# Patient Record
Sex: MALE | Race: WHITE | NOT HISPANIC OR LATINO | Employment: OTHER | URBAN - METROPOLITAN AREA
[De-identification: names, ages, dates, MRNs, and addresses within clinical notes are randomized per-mention and may not be internally consistent; named-entity substitution may affect disease eponyms.]

---

## 2022-11-15 ENCOUNTER — OFFICE VISIT (OUTPATIENT)
Dept: FAMILY MEDICINE CLINIC | Facility: CLINIC | Age: 37
End: 2022-11-15

## 2022-11-15 ENCOUNTER — LAB (OUTPATIENT)
Dept: LAB | Facility: HOSPITAL | Age: 37
End: 2022-11-15

## 2022-11-15 VITALS
WEIGHT: 198.4 LBS | RESPIRATION RATE: 21 BRPM | DIASTOLIC BLOOD PRESSURE: 92 MMHG | SYSTOLIC BLOOD PRESSURE: 120 MMHG | HEART RATE: 85 BPM | OXYGEN SATURATION: 100 % | HEIGHT: 72 IN | BODY MASS INDEX: 26.87 KG/M2 | TEMPERATURE: 98.4 F

## 2022-11-15 DIAGNOSIS — Z11.3 SCREENING EXAMINATION FOR STD (SEXUALLY TRANSMITTED DISEASE): ICD-10-CM

## 2022-11-15 DIAGNOSIS — R03.0 ELEVATED BLOOD PRESSURE READING: ICD-10-CM

## 2022-11-15 DIAGNOSIS — Z00.00 ENCOUNTER FOR ROUTINE ADULT HEALTH EXAMINATION WITHOUT ABNORMAL FINDINGS: ICD-10-CM

## 2022-11-15 PROBLEM — F17.200 SMOKING: Status: RESOLVED | Noted: 2022-11-15 | Resolved: 2022-11-15

## 2022-11-15 PROBLEM — F17.210 CIGARETTE NICOTINE DEPENDENCE: Status: ACTIVE | Noted: 2022-11-15

## 2022-11-15 PROBLEM — F17.200 SMOKING: Status: ACTIVE | Noted: 2022-11-15

## 2022-11-15 PROBLEM — I10 PRIMARY HYPERTENSION: Status: RESOLVED | Noted: 2022-11-15 | Resolved: 2022-11-15

## 2022-11-15 PROBLEM — I10 PRIMARY HYPERTENSION: Status: ACTIVE | Noted: 2022-11-15

## 2022-11-15 LAB
ALBUMIN SERPL-MCNC: 4.6 G/DL (ref 3.5–5.2)
ALBUMIN/GLOB SERPL: 1.8 G/DL
ALP SERPL-CCNC: 85 U/L (ref 39–117)
ALT SERPL W P-5'-P-CCNC: 73 U/L (ref 1–41)
ANION GAP SERPL CALCULATED.3IONS-SCNC: 12.8 MMOL/L (ref 5–15)
AST SERPL-CCNC: 67 U/L (ref 1–40)
BILIRUB SERPL-MCNC: <0.2 MG/DL (ref 0–1.2)
BUN SERPL-MCNC: 11 MG/DL (ref 6–20)
BUN/CREAT SERPL: 12.2 (ref 7–25)
CALCIUM SPEC-SCNC: 9.9 MG/DL (ref 8.6–10.5)
CHLORIDE SERPL-SCNC: 100 MMOL/L (ref 98–107)
CHOLEST SERPL-MCNC: 223 MG/DL (ref 0–200)
CO2 SERPL-SCNC: 24.2 MMOL/L (ref 22–29)
CREAT SERPL-MCNC: 0.9 MG/DL (ref 0.76–1.27)
EGFRCR SERPLBLD CKD-EPI 2021: 111.4 ML/MIN/1.73
GLOBULIN UR ELPH-MCNC: 2.6 GM/DL
GLUCOSE SERPL-MCNC: 97 MG/DL (ref 65–99)
HBA1C MFR BLD: 5 % (ref 4.8–5.6)
HDLC SERPL-MCNC: 86 MG/DL (ref 40–60)
HIV1+2 AB SER QL: NORMAL
LDLC SERPL CALC-MCNC: 125 MG/DL (ref 0–100)
LDLC/HDLC SERPL: 1.43 {RATIO}
POTASSIUM SERPL-SCNC: 4.8 MMOL/L (ref 3.5–5.2)
PROT SERPL-MCNC: 7.2 G/DL (ref 6–8.5)
SODIUM SERPL-SCNC: 137 MMOL/L (ref 136–145)
TRIGL SERPL-MCNC: 68 MG/DL (ref 0–150)
VLDLC SERPL-MCNC: 12 MG/DL (ref 5–40)

## 2022-11-15 PROCEDURE — 86803 HEPATITIS C AB TEST: CPT | Performed by: STUDENT IN AN ORGANIZED HEALTH CARE EDUCATION/TRAINING PROGRAM

## 2022-11-15 PROCEDURE — 99385 PREV VISIT NEW AGE 18-39: CPT | Performed by: STUDENT IN AN ORGANIZED HEALTH CARE EDUCATION/TRAINING PROGRAM

## 2022-11-15 PROCEDURE — 90686 IIV4 VACC NO PRSV 0.5 ML IM: CPT | Performed by: STUDENT IN AN ORGANIZED HEALTH CARE EDUCATION/TRAINING PROGRAM

## 2022-11-15 PROCEDURE — 80053 COMPREHEN METABOLIC PANEL: CPT | Performed by: STUDENT IN AN ORGANIZED HEALTH CARE EDUCATION/TRAINING PROGRAM

## 2022-11-15 PROCEDURE — G0432 EIA HIV-1/HIV-2 SCREEN: HCPCS | Performed by: STUDENT IN AN ORGANIZED HEALTH CARE EDUCATION/TRAINING PROGRAM

## 2022-11-15 PROCEDURE — 80061 LIPID PANEL: CPT | Performed by: STUDENT IN AN ORGANIZED HEALTH CARE EDUCATION/TRAINING PROGRAM

## 2022-11-15 PROCEDURE — 90471 IMMUNIZATION ADMIN: CPT | Performed by: STUDENT IN AN ORGANIZED HEALTH CARE EDUCATION/TRAINING PROGRAM

## 2022-11-15 PROCEDURE — 83036 HEMOGLOBIN GLYCOSYLATED A1C: CPT | Performed by: STUDENT IN AN ORGANIZED HEALTH CARE EDUCATION/TRAINING PROGRAM

## 2022-11-15 NOTE — PROGRESS NOTES
"     Male Physical Note      Patient Name: Francis Holland  : 1985   MRN: [id number]     Subjective    Subjective     Chief Complaint:    Chief Complaint   Patient presents with   • Establish Care       History of Present Illness: Francis Holland is a 37 y.o. male who is here today for their annual health maintenance and physical.          Past Medical History, Social History, Family History and Care Team were all reviewed with patient and updated as appropriate.     Medications:   No current outpatient medications on file.    Allergies:   No Known Allergies    Immunizations:  Td/Tdap(Booster Q 10 yrs):  uptodate   Flu (Yearly):  Ordered       Hep C: ordered    Diet/Physical activity: fair     Depression: PHQ-2 Depression Screening  Little interest or pleasure in doing things? 0-->not at all   Feeling down, depressed, or hopeless? 0-->not at all   PHQ-2 Total Score 0         Objective   Objective     Physical Exam:  Vital Signs:   Vitals:    11/15/22 0813   BP: 120/92   Pulse: 85   Resp: 21   Temp: 98.4 °F (36.9 °C)   TempSrc: Temporal   SpO2: 100%   Weight: 90 kg (198 lb 6.4 oz)   Height: 182.9 cm (72\")     Body mass index is 26.91 kg/m².     Physical Exam  Constitutional:       General: He is not in acute distress.     Appearance: He is not ill-appearing.   Cardiovascular:      Rate and Rhythm: Normal rate and regular rhythm.   Pulmonary:      Effort: Pulmonary effort is normal.      Breath sounds: Normal breath sounds.   Neurological:      Mental Status: He is alert.   Psychiatric:         Thought Content: Thought content normal.         Procedures    Assessment / Plan      Assessment/Plan:   Diagnoses and all orders for this visit:    1. Encounter for routine adult health examination without abnormal findings  -     FluLaval/Fluzone >6 mos (2598-7922)  -     Comprehensive Metabolic Panel  -     Lipid Panel  -     Hemoglobin A1c    2. Screening examination for STD (sexually transmitted disease)  -     HCV " Antibody Rfx To Qnt PCR  -     HIV-1 / O / 2 Ag / Antibody 4th Generation    3. Elevated blood pressure reading       Will check blood pressure at home discussed risk of high blood pressure, lose weight exercise, low-salt diet.  Follow-up in 3 months    Follow Up:   Return in about 3 months (around 2/15/2023) for Follow-up.    Healthcare Maintenance:   Health maintenance counseling included discussion of healthy diet and physical activity, Dental hygiene, and vaccinations.  Francis Holland voices understanding and acceptance of this advice and will call back with any further questions or concerns. AVS with preventive healthcare tips printed for patient.     Mauro Lake MD  Family Medicine - [place] [facility] [place]

## 2022-11-16 LAB
HCV AB S/CO SERPL IA: <0.1 S/CO RATIO (ref 0–0.9)
HCV AB SERPL QL IA: NORMAL

## 2022-11-17 DIAGNOSIS — R74.8 ELEVATED LIVER ENZYMES: Primary | ICD-10-CM

## 2022-11-30 ENCOUNTER — OFFICE VISIT (OUTPATIENT)
Dept: FAMILY MEDICINE CLINIC | Facility: CLINIC | Age: 37
End: 2022-11-30

## 2022-11-30 VITALS
OXYGEN SATURATION: 99 % | HEIGHT: 72 IN | DIASTOLIC BLOOD PRESSURE: 74 MMHG | WEIGHT: 203.4 LBS | HEART RATE: 80 BPM | BODY MASS INDEX: 27.55 KG/M2 | SYSTOLIC BLOOD PRESSURE: 138 MMHG | TEMPERATURE: 99.4 F

## 2022-11-30 DIAGNOSIS — K04.7 DENTAL INFECTION: Primary | ICD-10-CM

## 2022-11-30 PROCEDURE — 99213 OFFICE O/P EST LOW 20 MIN: CPT | Performed by: PHYSICIAN ASSISTANT

## 2022-11-30 RX ORDER — AMOXICILLIN AND CLAVULANATE POTASSIUM 875; 125 MG/1; MG/1
1 TABLET, FILM COATED ORAL 2 TIMES DAILY
Qty: 20 TABLET | Refills: 0 | Status: SHIPPED | OUTPATIENT
Start: 2022-11-30 | End: 2022-12-30

## 2022-12-10 NOTE — PROGRESS NOTES
"     Follow Up Office Visit      Date: 2022   Patient Name: Francis Holland  : 1985   MRN: [id number]     Chief Complaint:    Chief Complaint   Patient presents with   • Dental Pain     Started sat  Pt cant get into dentist for 2 weeks       History of Present Illness: Francis Holland is a 37 y.o. male who is here today to follow up with a dental infection. Unfortunately he can't get into his dentist for 2 weeks. He has had swelling and pain left side of mouth.       Subjective      Review of systems:  Review of Systems   Constitutional: Negative for fatigue and fever.   HENT: Positive for dental problem. Negative for trouble swallowing.    Respiratory: Negative for shortness of breath.    Cardiovascular: Negative for chest pain and leg swelling.        I have reviewed and the following portions of the patient's history were updated as appropriate: past family history, past medical history, past social history, past surgical history and problem list.    Medications:     Current Outpatient Medications:   •  amoxicillin-clavulanate (Augmentin) 875-125 MG per tablet, Take 1 tablet by mouth 2 (Two) Times a Day., Disp: 20 tablet, Rfl: 0    Allergies:   No Known Allergies    Objective     Vital Signs:   Vitals:    22 0945   BP: 138/74   Pulse: 80   Temp: 99.4 °F (37.4 °C)   TempSrc: Infrared   SpO2: 99%   Weight: 92.3 kg (203 lb 6.4 oz)   Height: 182.9 cm (72.01\")   PainSc:   4     Body mass index is 27.58 kg/m².   BMI is >= 25 and <30. (Overweight) The following options were offered after discussion;: weight loss educational material (shared in after visit summary)      Physical Exam:   Physical Exam  Vitals and nursing note reviewed.   Constitutional:       Appearance: Normal appearance.   HENT:      Head: Normocephalic and atraumatic.      Mouth/Throat:      Dentition: Dental tenderness and gingival swelling present.   Cardiovascular:      Rate and Rhythm: Normal rate and regular rhythm.   Pulmonary:      " Effort: Pulmonary effort is normal.      Breath sounds: Normal breath sounds.   Musculoskeletal:      Cervical back: Neck supple.   Neurological:      Mental Status: He is alert.          Assessment / Plan      Assessment/Plan:   Diagnoses and all orders for this visit:    1. Dental infection (Primary)  -     amoxicillin-clavulanate (Augmentin) 875-125 MG per tablet; Take 1 tablet by mouth 2 (Two) Times a Day.  Dispense: 20 tablet; Refill: 0     Med as directed. Push fluids. Notify me of any problems.    Follow Up:   No follow-ups on file.    Shani Barth PA-C   [facility] [place] [facility] [place]

## 2022-12-30 ENCOUNTER — LAB (OUTPATIENT)
Dept: LAB | Facility: HOSPITAL | Age: 37
End: 2022-12-30
Payer: COMMERCIAL

## 2022-12-30 ENCOUNTER — OFFICE VISIT (OUTPATIENT)
Dept: FAMILY MEDICINE CLINIC | Facility: CLINIC | Age: 37
End: 2022-12-30

## 2022-12-30 VITALS
RESPIRATION RATE: 16 BRPM | SYSTOLIC BLOOD PRESSURE: 116 MMHG | TEMPERATURE: 98.9 F | WEIGHT: 193.8 LBS | OXYGEN SATURATION: 99 % | HEIGHT: 72 IN | DIASTOLIC BLOOD PRESSURE: 88 MMHG | HEART RATE: 102 BPM | BODY MASS INDEX: 26.25 KG/M2

## 2022-12-30 DIAGNOSIS — S46.011A TRAUMATIC TEAR OF RIGHT ROTATOR CUFF, UNSPECIFIED TEAR EXTENT, INITIAL ENCOUNTER: Primary | ICD-10-CM

## 2022-12-30 DIAGNOSIS — M25.511 ACUTE PAIN OF RIGHT SHOULDER: ICD-10-CM

## 2022-12-30 DIAGNOSIS — R74.8 ELEVATED LIVER ENZYMES: ICD-10-CM

## 2022-12-30 PROBLEM — Z87.898 HISTORY OF ALCOHOL USE: Status: ACTIVE | Noted: 2022-12-30

## 2022-12-30 LAB
ALBUMIN SERPL-MCNC: 5 G/DL (ref 3.5–5.2)
ALBUMIN/GLOB SERPL: 1.6 G/DL
ALP SERPL-CCNC: 94 U/L (ref 39–117)
ALT SERPL W P-5'-P-CCNC: 61 U/L (ref 1–41)
ANION GAP SERPL CALCULATED.3IONS-SCNC: 14.9 MMOL/L (ref 5–15)
AST SERPL-CCNC: 53 U/L (ref 1–40)
BASOPHILS # BLD AUTO: 0.09 10*3/MM3 (ref 0–0.2)
BASOPHILS NFR BLD AUTO: 0.9 % (ref 0–1.5)
BILIRUB SERPL-MCNC: 0.4 MG/DL (ref 0–1.2)
BUN SERPL-MCNC: 9 MG/DL (ref 6–20)
BUN/CREAT SERPL: 11.3 (ref 7–25)
CALCIUM SPEC-SCNC: 9.8 MG/DL (ref 8.6–10.5)
CHLORIDE SERPL-SCNC: 100 MMOL/L (ref 98–107)
CO2 SERPL-SCNC: 22.1 MMOL/L (ref 22–29)
CREAT SERPL-MCNC: 0.8 MG/DL (ref 0.76–1.27)
DEPRECATED RDW RBC AUTO: 41.1 FL (ref 37–54)
EGFRCR SERPLBLD CKD-EPI 2021: 115.5 ML/MIN/1.73
EOSINOPHIL # BLD AUTO: 0.27 10*3/MM3 (ref 0–0.4)
EOSINOPHIL NFR BLD AUTO: 2.7 % (ref 0.3–6.2)
ERYTHROCYTE [DISTWIDTH] IN BLOOD BY AUTOMATED COUNT: 11.9 % (ref 12.3–15.4)
GLOBULIN UR ELPH-MCNC: 3.1 GM/DL
GLUCOSE SERPL-MCNC: 98 MG/DL (ref 65–99)
HBA1C MFR BLD: 5.3 % (ref 4.8–5.6)
HCT VFR BLD AUTO: 43.3 % (ref 37.5–51)
HGB BLD-MCNC: 15.2 G/DL (ref 13–17.7)
IMM GRANULOCYTES # BLD AUTO: 0.05 10*3/MM3 (ref 0–0.05)
IMM GRANULOCYTES NFR BLD AUTO: 0.5 % (ref 0–0.5)
INR PPP: 0.9 (ref 0.84–1.13)
IRON 24H UR-MRATE: 152 MCG/DL (ref 59–158)
IRON SATN MFR SERPL: 32 % (ref 20–50)
LYMPHOCYTES # BLD AUTO: 1.42 10*3/MM3 (ref 0.7–3.1)
LYMPHOCYTES NFR BLD AUTO: 14.3 % (ref 19.6–45.3)
MCH RBC QN AUTO: 33.1 PG (ref 26.6–33)
MCHC RBC AUTO-ENTMCNC: 35.1 G/DL (ref 31.5–35.7)
MCV RBC AUTO: 94.3 FL (ref 79–97)
MONOCYTES # BLD AUTO: 1.56 10*3/MM3 (ref 0.1–0.9)
MONOCYTES NFR BLD AUTO: 15.8 % (ref 5–12)
NEUTROPHILS NFR BLD AUTO: 6.51 10*3/MM3 (ref 1.7–7)
NEUTROPHILS NFR BLD AUTO: 65.8 % (ref 42.7–76)
NRBC BLD AUTO-RTO: 0 /100 WBC (ref 0–0.2)
PLATELET # BLD AUTO: 260 10*3/MM3 (ref 140–450)
PMV BLD AUTO: 10.9 FL (ref 6–12)
POTASSIUM SERPL-SCNC: 4.4 MMOL/L (ref 3.5–5.2)
PROT SERPL-MCNC: 8.1 G/DL (ref 6–8.5)
PROTHROMBIN TIME: 12 SECONDS (ref 11.4–14.4)
RBC # BLD AUTO: 4.59 10*6/MM3 (ref 4.14–5.8)
SODIUM SERPL-SCNC: 137 MMOL/L (ref 136–145)
TIBC SERPL-MCNC: 471 MCG/DL (ref 298–536)
TRANSFERRIN SERPL-MCNC: 316 MG/DL (ref 200–360)
WBC NRBC COR # BLD: 9.9 10*3/MM3 (ref 3.4–10.8)

## 2022-12-30 PROCEDURE — 80053 COMPREHEN METABOLIC PANEL: CPT | Performed by: STUDENT IN AN ORGANIZED HEALTH CARE EDUCATION/TRAINING PROGRAM

## 2022-12-30 PROCEDURE — 85610 PROTHROMBIN TIME: CPT | Performed by: STUDENT IN AN ORGANIZED HEALTH CARE EDUCATION/TRAINING PROGRAM

## 2022-12-30 PROCEDURE — 83036 HEMOGLOBIN GLYCOSYLATED A1C: CPT | Performed by: STUDENT IN AN ORGANIZED HEALTH CARE EDUCATION/TRAINING PROGRAM

## 2022-12-30 PROCEDURE — 83540 ASSAY OF IRON: CPT | Performed by: STUDENT IN AN ORGANIZED HEALTH CARE EDUCATION/TRAINING PROGRAM

## 2022-12-30 PROCEDURE — 85025 COMPLETE CBC W/AUTO DIFF WBC: CPT | Performed by: STUDENT IN AN ORGANIZED HEALTH CARE EDUCATION/TRAINING PROGRAM

## 2022-12-30 PROCEDURE — 86704 HEP B CORE ANTIBODY TOTAL: CPT | Performed by: STUDENT IN AN ORGANIZED HEALTH CARE EDUCATION/TRAINING PROGRAM

## 2022-12-30 PROCEDURE — 84466 ASSAY OF TRANSFERRIN: CPT | Performed by: STUDENT IN AN ORGANIZED HEALTH CARE EDUCATION/TRAINING PROGRAM

## 2022-12-30 PROCEDURE — 87340 HEPATITIS B SURFACE AG IA: CPT | Performed by: STUDENT IN AN ORGANIZED HEALTH CARE EDUCATION/TRAINING PROGRAM

## 2022-12-30 PROCEDURE — 86706 HEP B SURFACE ANTIBODY: CPT | Performed by: STUDENT IN AN ORGANIZED HEALTH CARE EDUCATION/TRAINING PROGRAM

## 2022-12-30 PROCEDURE — 99214 OFFICE O/P EST MOD 30 MIN: CPT | Performed by: STUDENT IN AN ORGANIZED HEALTH CARE EDUCATION/TRAINING PROGRAM

## 2022-12-30 RX ORDER — ACETAMINOPHEN 500 MG
500 TABLET ORAL EVERY 6 HOURS PRN
COMMUNITY

## 2022-12-30 RX ORDER — NAPROXEN 500 MG/1
500 TABLET ORAL 2 TIMES DAILY WITH MEALS
Qty: 20 TABLET | Refills: 0 | Status: SHIPPED | OUTPATIENT
Start: 2022-12-30 | End: 2023-01-09

## 2022-12-30 RX ORDER — IBUPROFEN 200 MG
200 TABLET ORAL EVERY 6 HOURS PRN
COMMUNITY
End: 2023-03-07

## 2022-12-30 NOTE — PROGRESS NOTES
"  Established Patient Office Visit        Subjective      Chief Complaint:  Shoulder Injury (Shoulder injury from a month ago, patient had lifted a heavy meat package and cant lift arm after a certain extent and cant move his arm back)      History of Present Illness: Francis Holland is a 37 y.o. male who presents for right shoulder pain.    About a month ago lifted heavy box and felt pain immediately. Felt pop and crunch.  Felt pain immediately after this.  Now with continued pain decrease ROM and weakness. Minimal relief with naproxen.  Just prior to this injury he did get his arm and back just a little bit when wrestling.    12 years ago hurt shoulder lifting objects. intermittent since.     Patient Active Problem List   Diagnosis   • Cigarette nicotine dependence   • Elevated blood pressure reading   • Elevated liver enzymes   • History of alcohol use         Current Outpatient Medications:   •  acetaminophen (TYLENOL) 500 MG tablet, Take 500 mg by mouth Every 6 (Six) Hours As Needed for Mild Pain. Otc as needed, Disp: , Rfl:   •  ibuprofen (ADVIL,MOTRIN) 200 MG tablet, Take 200 mg by mouth Every 6 (Six) Hours As Needed for Mild Pain., Disp: , Rfl:   •  naproxen (Naprosyn) 500 MG tablet, Take 1 tablet by mouth 2 (Two) Times a Day With Meals for 10 days., Disp: 20 tablet, Rfl: 0       Objective     Physical Exam:   Vital Signs:   /88 (BP Location: Left arm, Patient Position: Sitting, Cuff Size: Adult)   Pulse 102   Temp 98.9 °F (37.2 °C) (Temporal)   Resp 16   Ht 182.9 cm (72.01\")   Wt 87.9 kg (193 lb 12.8 oz)   SpO2 99%   BMI 26.28 kg/m²      Physical Exam  Constitutional:       General: He is not in acute distress.     Appearance: He is not ill-appearing.   Patient with notable asymmetry to the right shoulder with mild anterior displacement of the humeral head compared to left shoulder.  Tenderness to the posterior and anterior right shoulder..  Unable to tolerate internal rotation posterior to back.  " Positive empty can and unable to tolerate that exam.  Unable to tolerate pain when testing abduction.         Assessment / Plan      Assessment/Plan:   Diagnoses and all orders for this visit:    1. Traumatic tear of right rotator cuff, unspecified tear extent, initial encounter (Primary)  -     MRI Shoulder Right Without Contrast; Future    2. Acute pain of right shoulder  -     XR Shoulder 2+ View Right; Future  -     naproxen (Naprosyn) 500 MG tablet; Take 1 tablet by mouth 2 (Two) Times a Day With Meals for 10 days.  Dispense: 20 tablet; Refill: 0  -     Ambulatory Referral to Orthopedic Surgery    3. Elevated liver enzymes  -     Comprehensive Metabolic Panel  -     Protime-INR  -     Hepatitis B Virus (HBV) Screening and Diagnosis  -     Hemoglobin A1c  -     Iron Profile  -     CBC & Differential       Likely tear of right rotator cuff.  Given significant weakness and pain after a month, failed NSAID therapy patient will benefit from MRI.  Rx for naproxen.  Recommend no heavy lifting with the right arm. Referral orthopedics    Patient with history of heavy alcohol use.  I recommend continued decreasing alcohol use.  Check labs to rule out other causes of elevated liver enzymes.  If still elevated would recommend liver ultrasound at that point.     Follow Up:   No follow-ups on file.      MDM: Lab test prescription drug    Mauro Lake MD  Family Medicine - [place] [facility] [place]

## 2022-12-31 LAB
HBV CORE AB SERPL QL IA: NEGATIVE
HBV SURFACE AB SER QL: NON REACTIVE
HBV SURFACE AG SERPL QL IA: NEGATIVE
IMP & REVIEW OF LAB RESULTS: NORMAL
LABORATORY COMMENT REPORT: NORMAL

## 2023-01-06 ENCOUNTER — OFFICE VISIT (OUTPATIENT)
Dept: ORTHOPEDIC SURGERY | Facility: CLINIC | Age: 38
End: 2023-01-06
Payer: COMMERCIAL

## 2023-01-06 VITALS
DIASTOLIC BLOOD PRESSURE: 80 MMHG | WEIGHT: 193.78 LBS | SYSTOLIC BLOOD PRESSURE: 114 MMHG | HEIGHT: 72 IN | BODY MASS INDEX: 26.25 KG/M2

## 2023-01-06 DIAGNOSIS — F17.200 SMOKER: Primary | ICD-10-CM

## 2023-01-06 DIAGNOSIS — M19.011 OSTEOARTHRITIS OF RIGHT AC (ACROMIOCLAVICULAR) JOINT: ICD-10-CM

## 2023-01-06 DIAGNOSIS — M75.111 NONTRAUMATIC INCOMPLETE TEAR OF RIGHT ROTATOR CUFF: ICD-10-CM

## 2023-01-06 PROCEDURE — 99204 OFFICE O/P NEW MOD 45 MIN: CPT | Performed by: STUDENT IN AN ORGANIZED HEALTH CARE EDUCATION/TRAINING PROGRAM

## 2023-01-06 RX ORDER — CLINDAMYCIN HYDROCHLORIDE 300 MG/1
CAPSULE ORAL
COMMUNITY
Start: 2023-01-05 | End: 2023-02-23

## 2023-01-06 RX ORDER — METHYLPREDNISOLONE 4 MG/1
TABLET ORAL
Qty: 21 TABLET | Refills: 0 | Status: SHIPPED | OUTPATIENT
Start: 2023-01-06 | End: 2023-02-23

## 2023-01-06 NOTE — PROGRESS NOTES
[facility] [place] [facility] Office Visit     Office Visit       Date: 01/06/2023   Patient Name: Francis Holland  MRN: [id number]  YOB: 1985    Referring Physician: Mauro Lake MD     Chief Complaint:   Chief Complaint   Patient presents with   • Right Shoulder - Pain       History of Present Illness:   Francis Holland is a 37 y.o. male who presents with new problem of: right shoulder pain.  Onset: atraumatic and gradual in nature. The issue has been ongoing for 15 year(s). Pain is a 4-5/10 on the pain scale. Pain is described as aching and burning. Associated symptoms include pain, popping and grinding. The pain is worse with sleeping, lying on affected side and raising arm above shoulder; holding the arm improve the pain. Previous treatments have included: NSAIDS.    Subjective   Review of Systems: Review of Systems   Constitutional: Negative for chills, fever, unexpected weight gain and unexpected weight loss.   HENT: Negative for congestion, postnasal drip and rhinorrhea.    Eyes: Negative for blurred vision.   Respiratory: Negative for shortness of breath.    Cardiovascular: Negative for leg swelling.   Gastrointestinal: Negative for abdominal pain, nausea and vomiting.   Genitourinary: Negative for difficulty urinating.   Musculoskeletal: Positive for arthralgias. Negative for gait problem, joint swelling and myalgias.   Skin: Negative for skin lesions and wound.   Neurological: Negative for dizziness, weakness, light-headedness and numbness.   Hematological: Does not bruise/bleed easily.   Psychiatric/Behavioral: Negative for depressed mood.        I have reviewed the following portions of the patient's history:History of Present Illness and review of systems.    Past Medical History:   Past Medical History:   Diagnosis Date   • Glaucoma    • Substance abuse (HCC) 2013    In recovery since 2013.       Past Surgical History:   Past Surgical History:    Procedure Laterality Date   • HERNIA REPAIR      Then had a hydra-seal repair in , [facility]   • HYDROCELE EXCISION / REPAIR             Family History:   Family History   Problem Relation Age of Onset   • Drug abuse Mother    • Hyperlipidemia Mother    • Mental illness Mother    • Stroke Mother 55   • Alcohol abuse Father    • Drug abuse Father    • Drug abuse Sister    • Drug abuse Sister    • Drug abuse Sister    • Drug abuse Sister    • Early death Brother         Kee was my older brother,  he  when he was a few weeks old.       Social History:   Social History     Socioeconomic History   • Marital status:    Tobacco Use   • Smoking status: Every Day     Packs/day: 1.00     Years: 20.00     Pack years: 20.00     Types: Cigarettes   • Smokeless tobacco: Never   Vaping Use   • Vaping Use: Never used   Substance and Sexual Activity   • Alcohol use: Yes     Alcohol/week: 6.0 standard drinks     Types: 6 Cans of beer per week   • Drug use: Not Currently     Types: Marijuana     Comment: In recovery since    • Sexual activity: Yes     Partners: Female       Medications:   Current Outpatient Medications:   •  acetaminophen (TYLENOL) 500 MG tablet, Take 500 mg by mouth Every 6 (Six) Hours As Needed for Mild Pain. Otc as needed, Disp: , Rfl:   •  clindamycin (CLEOCIN) 300 MG capsule, , Disp: , Rfl:   •  ibuprofen (ADVIL,MOTRIN) 200 MG tablet, Take 200 mg by mouth Every 6 (Six) Hours As Needed for Mild Pain., Disp: , Rfl:   •  naproxen (Naprosyn) 500 MG tablet, Take 1 tablet by mouth 2 (Two) Times a Day With Meals for 10 days., Disp: 20 tablet, Rfl: 0  •  methylPREDNISolone (MEDROL) 4 MG dose pack, Take as directed on package instructions., Disp: 21 tablet, Rfl: 0    Allergies: No Known Allergies    I reviewed the patient's chief complaint, history of present illness, review of systems, past medical history, surgical history, family history, social history, medications and allergy list.      Objective    Vital Signs:   Vitals:    01/06/23 0823   BP: 114/80   Weight: 87.9 kg (193 lb 12.6 oz)   Height: 182.9 cm (72.01\")     Body mass index is 26.28 kg/m².   BMI is >= 25 and <30. (Overweight) The following options were offered after discussion;: exercise counseling/recommendations and nutrition counseling/recommendations     In this visit the patient was advised to stop smoking and was offered tobacco cessation measures and resources, including NRT and/or medication intervention. At least 5 minutes was spent on face-to-face counseling regarding smoking cessation.     Ortho Exam:  Constitutional: General Appearance: healthy-appearing, NAD, and normal body habitus.   Psychiatric: Mood and Affect: normal mood and affect and active and alert.   Cardiovascular System: Arterial Pulses Right: radial normal. Arterial Pulses Left: radial normal.   C-Spine/Neck: Active Range of Motion: no crepitus or pain elicited on motion and flexion normal, extension normal, and rotation normal.   Shoulders: Inspection Right: no misalignment, atrophy, erythema, induration, swelling, warmth, or scapular winging and AC prominence exaggerated. Inspection Left: no misalignment, atrophy, erythema, induration, swelling, warmth, or scapular winging and AC prominence normal. Bony Palpation Right: no tenderness of the suprasternal notch, the sternoclavicular joint, the coracoid process, the greater tuberosity, the bicipital groove, or the scapula and tenderness of the clavicle lateral one-third, the acromioclavicular joint, and the acromial. Bony Palpation Left: no tenderness of the suprasternal notch, the sternoclavicular joint, the clavicle, the coracoid process, the acromioclavicular joint, the acromial, the greater tuberosity, the bicipital groove, or the scapula. Soft Tissue Palpation Right: no tenderness of the teres minor, the subacromial bursa, the subdeltoid bursa, the axilla, the glenohumeral joint region, the pectoralis  major insertion, the sternocleidomastoid, the costochondral junction, the trapezius, the rhomboid, the latissimus dorsi, the serratus, the deltoid, the levator scapulae, or the lateral cuff insertion and tenderness of the supraspinatus and the infraspinatus. Soft Tissue Palpation Left: no tenderness of the supraspinatus, the infraspinatus, the teres minor, the subacromial bursa, the subdeltoid bursa, the axilla, the glenohumeral joint region, the pectoralis major insertion, the sternocleidomastoid, the costochondral junction, the trapezius, the rhomboid, the latissimus dorsi, the serratus, the deltoid, the levator scapulae, or the lateral cuff insertion. Active Range of Motion Right: forward flexion (90 deg.), internal rotation (30 deg.), and abduction (90 deg.). Active Range of Motion Left: normal. Special Tests Right: Hawkin's test positive, Neer's test positive, Howell's test positive, and empty can sign positive; pain with cross arm testing. Stability Right: anterior relocation test negative, apprehension test negative, and load and shift test negative; posterior apprehension test negative and load and shift test negative; and no dislocation. Strength Right: abduction 4/5 and flexion 4/5.   Skin: Right Upper Extremity: normal. Left Upper Extremity: normal.    Results Review:   Imaging Results (Last 24 Hours)     ** No results found for the last 24 hours. **        XR Shoulder 2+ View Right (12/30/2022 10:30)  I personally reviewed the radiographs of the right shoulder.  No acute fracture or dislocation.  No significant humeral head elevation.  There is mild narrowing of the AC joint.    Procedures    Assessment / Plan    Assessment/Plan:   Diagnoses and all orders for this visit:    1. Smoker (Primary)    2. Osteoarthritis of right AC (acromioclavicular) joint  -     Ambulatory Referral to Physical Therapy Evaluate and treat, Ortho; Electrotherapy; E-stim; Soft Tissue Mobilizaton; Stretching, ROM,  Strengthening    3. Nontraumatic incomplete tear of right rotator cuff    Other orders  -     methylPREDNISolone (MEDROL) 4 MG dose pack; Take as directed on package instructions.  Dispense: 21 tablet; Refill: 0      Shoulder injury x1 month ago.  He lifted deer meat and felt a pop in the anterior shoulder.  His pain is now more circumferential from anterior to posterior.  He also has pain laterally that goes towards the elbow.  He denies any numbness tingling but does have relative weakness compared to the left side.  He is right-hand dominant.  Radiographs show degenerative changes of the AC joint but otherwise normal shoulder joint.  I believe that most of his degenerative changes come from his occupation as a .  The pop likely came from the AC joint but certainly could have come from the rotator cuff.  He is tender there as well with reduced range of motion and strength.  His primary care physician prior to the referral to me placed an order for MRI of the right shoulder.  I certainly believe this is warranted.  I would like to move up the date from 2/6/2023.  In the meantime, I will provide him with a referral for physical therapy.  He needs to work on his motion and strength.  As he is still working, I will attempt to help him as much as possible by pausing the naproxen at this point.  I will start him on a Medrol Dosepak for the next 5 days.  He was instructed not to take any NSAIDs.  He can then return to naproxen at the conclusion of the Dosepak.  I plan to see him back in 5 weeks if not sooner to review his MRI and decide on additional treatment options.    Past documentation reviewed: 12/30/2022-Mauro Lake MD.    Past laboratory results reviewed: 12/30/2022-hemoglobin A1c 5.30%, creatinine 0.80, EGFR 115.5.    Past radiographs reviewed: 12/30/2022-2+ views of the right shoulder.    Follow Up:   Return in about 5 weeks (around 2/10/2023) for Recheck.      Yo Epstein MD  [facility] [place] [facility]

## 2023-02-06 ENCOUNTER — HOSPITAL ENCOUNTER (OUTPATIENT)
Dept: MRI IMAGING | Facility: HOSPITAL | Age: 38
Discharge: HOME | End: 2023-02-06
Payer: COMMERCIAL

## 2023-02-06 ENCOUNTER — TELEPHONE (OUTPATIENT)
Dept: FAMILY MEDICINE CLINIC | Facility: CLINIC | Age: 38
End: 2023-02-06

## 2023-02-06 DIAGNOSIS — F41.9 ANXIETY DUE TO INVASIVE PROCEDURE: Primary | ICD-10-CM

## 2023-02-06 DIAGNOSIS — S46.011A TRAUMATIC TEAR OF RIGHT ROTATOR CUFF, UNSPECIFIED TEAR EXTENT, INITIAL ENCOUNTER: ICD-10-CM

## 2023-02-06 RX ORDER — ALPRAZOLAM 0.5 MG/1
0.5 TABLET ORAL ONCE
Qty: 1 TABLET | Refills: 0 | Status: SHIPPED | OUTPATIENT
Start: 2023-02-06 | End: 2023-02-06

## 2023-02-06 NOTE — TELEPHONE ENCOUNTER
Caller: Namrata Holland    Relationship: Self    Best call back number: [phone]    What is the best time to reach you: ANY TIME    Who are you requesting to speak with (clinical staff, provider,  specific staff member): CLINICAL STAFF    Do you know the name of the person who called: NAMRATA    What was the call regarding: PATIENT STATES THAT HE WAS UNABLE TO MAKE IT THROUGH HIS MRI TODAY 2/6/23 AND WAS TOLD THAT DR MORALEZ WOULD BE CONTACTED TO DISCUSS GIVING THE PATIENT SOME ANTI ANXIETY MEDICATION TO HELP WITH THE TEST THE NEXT TIME HE RETURNS TO DO IT.     Do you require a callback: YES

## 2023-02-09 ENCOUNTER — TELEPHONE (OUTPATIENT)
Dept: ORTHOPEDIC SURGERY | Facility: CLINIC | Age: 38
End: 2023-02-09
Payer: COMMERCIAL

## 2023-02-09 NOTE — TELEPHONE ENCOUNTER
CALLED TO RESCHEDULE HIS APPOINTMENT ON 2/10/23. HIS MRI IS SCHEDULED FOR 2/13/23.     HUB OK TO RESCHEDULE.

## 2023-02-13 ENCOUNTER — HOSPITAL ENCOUNTER (OUTPATIENT)
Dept: MRI IMAGING | Facility: HOSPITAL | Age: 38
Discharge: HOME | End: 2023-02-13
Payer: COMMERCIAL

## 2023-02-16 ENCOUNTER — TELEPHONE (OUTPATIENT)
Dept: ORTHOPEDIC SURGERY | Facility: CLINIC | Age: 38
End: 2023-02-16
Payer: COMMERCIAL

## 2023-02-22 ENCOUNTER — TELEPHONE (OUTPATIENT)
Dept: ORTHOPEDIC SURGERY | Facility: CLINIC | Age: 38
End: 2023-02-22
Payer: COMMERCIAL

## 2023-02-22 NOTE — TELEPHONE ENCOUNTER
NATE OK TO READ:    Dr. Epstein would like to see patient tomorrow when Dr. Lombardi is in the office.  OK to add on to Dr. Epstein's schedule per Dr. Epstein.  Patient to call back to schedule.  IF patient is unavailable tomorrow, please schedule him on Monday with Dr. Epstein.    Thank you,    Zenobia RUSH(SHILPA)

## 2023-02-23 ENCOUNTER — TELEMEDICINE (OUTPATIENT)
Dept: FAMILY MEDICINE CLINIC | Facility: CLINIC | Age: 38
End: 2023-02-23
Payer: COMMERCIAL

## 2023-02-23 DIAGNOSIS — S46.011A TRAUMATIC TEAR OF RIGHT ROTATOR CUFF, UNSPECIFIED TEAR EXTENT, INITIAL ENCOUNTER: ICD-10-CM

## 2023-02-23 DIAGNOSIS — F17.210 CIGARETTE NICOTINE DEPENDENCE WITHOUT COMPLICATION: Primary | ICD-10-CM

## 2023-02-23 DIAGNOSIS — S43.431D LABRAL TEAR OF SHOULDER, RIGHT, SUBSEQUENT ENCOUNTER: ICD-10-CM

## 2023-02-23 PROCEDURE — 99213 OFFICE O/P EST LOW 20 MIN: CPT | Performed by: STUDENT IN AN ORGANIZED HEALTH CARE EDUCATION/TRAINING PROGRAM

## 2023-02-23 RX ORDER — VARENICLINE TARTRATE 1 MG/1
1 TABLET, FILM COATED ORAL 2 TIMES DAILY
Qty: 60 TABLET | Refills: 5 | Status: SHIPPED | OUTPATIENT
Start: 2023-02-23

## 2023-02-23 RX ORDER — VARENICLINE TARTRATE 0.5 MG/1
TABLET, FILM COATED ORAL
Qty: 11 TABLET | Refills: 0 | Status: SHIPPED | OUTPATIENT
Start: 2023-02-23

## 2023-02-23 NOTE — PROGRESS NOTES
Francis Holland verbally consented to a telehealth visit. Francis Holland was seen via telehealth using video-conference by Dr. Mauro Lake. This visit was requested because of the COVID-19 pandemic. Francis Holland was located at patient's home address and Dr. Mauro Lake was located at [place], [place]. Dr. Mauro Lake and Francis Holland participated in the telehealth visit.         Chief Complaint  Francis Holland is a 37 y.o. male who presents for smoking cessation and shoulder injury     Patient desires further follow-up of shoulder injury as he still has decreased mobility to the right shoulder and pain.  This is affecting his quality of life.     MRI of the right shoulder shows large subscapularis insertional tear.  Superior labral tear that extends into the biceps root.  Partial tear at the humeral attachment anterior band inferior glenohumeral ligament.  Low-grade bursal surface fraying of the supraspinatus tendon fraying    I have reviewed orthopedics note and he was prescribed Medrol Dosepak at that visit.    The following portions of the patient's history were reviewed and updated as appropriate: allergies, current medications, past family history, past medical history, past social history, past surgical history and problem list.    Review of Systems    Objective  Vital signs available: No      Assessment/Plan   1. Cigarette nicotine dependence without complication  - varenicline (CHANTIX) 0.5 MG tablet; Take 0.5 mg po daily x 3 days, then 0.5 mg po bid x 4 days,  Dispense: 11 tablet; Refill: 0  - varenicline (CHANTIX) 1 MG tablet; Take 1 tablet by mouth 2 (Two) Times a Day. Start after initial prescription  Dispense: 60 tablet; Refill: 5  -Quit date for smoking will be his birthday [birth year].  -We will try Chantix and nicotine gum.  Handout given.    2. Traumatic tear of right rotator cuff, unspecified tear extent, initial encounter  3. Labral tear of shoulder, right,  subsequent encounter  -Patient desires to follow-up with orthopedics in regards to options for management.  -He will call to make an appointment.  -It seems he desires to avoid surgery if possible but also does not want to make a full recovery.  I further encouraged him to discuss details in regards to recommendations and expected outcomes with orthopedics    Maruo Lake MD  Family Medicine - [place] [facility] [place]          No follow-ups on file.    Mauro Lake MD  Family Medicine - [place] [facility] [place]    Part of this note may be an electronic transcription/translation of spoken language to printed text using the Dragon Dictation System.

## 2023-02-24 ENCOUNTER — TELEPHONE (OUTPATIENT)
Dept: ORTHOPEDIC SURGERY | Facility: CLINIC | Age: 38
End: 2023-02-24
Payer: COMMERCIAL

## 2023-02-24 NOTE — TELEPHONE ENCOUNTER
Please reschedule patient's MRI follow up for this Monday, 02.27.2023, with Dr. Epstein.    Thank you,    Zenobia FARRAR)

## 2023-02-27 ENCOUNTER — OFFICE VISIT (OUTPATIENT)
Dept: ORTHOPEDIC SURGERY | Facility: CLINIC | Age: 38
End: 2023-02-27
Payer: COMMERCIAL

## 2023-02-27 VITALS
SYSTOLIC BLOOD PRESSURE: 138 MMHG | DIASTOLIC BLOOD PRESSURE: 86 MMHG | WEIGHT: 186.2 LBS | HEIGHT: 72 IN | BODY MASS INDEX: 25.22 KG/M2

## 2023-02-27 DIAGNOSIS — S46.811A FULL THICKNESS TEAR OF RIGHT SUBSCAPULARIS TENDON, INITIAL ENCOUNTER: Primary | ICD-10-CM

## 2023-02-27 DIAGNOSIS — S43.003A SUBLUXATION OF TENDON OF LONG HEAD OF BICEPS: ICD-10-CM

## 2023-02-27 PROCEDURE — 99213 OFFICE O/P EST LOW 20 MIN: CPT | Performed by: STUDENT IN AN ORGANIZED HEALTH CARE EDUCATION/TRAINING PROGRAM

## 2023-02-27 NOTE — PROGRESS NOTES
[facility] Office Follow Up Visit     Office Follow Up      Date: 02/27/2023   Patient Name: Francis Holland  MRN: [id number]  YOB: 1985    Referring Physician: No ref. provider found     Chief Complaint:   Chief Complaint   Patient presents with   • Follow-up     7 week MRI f/u (2/20/23)-- Nontraumatic incomplete tear of right rotator cuff       History of Present Illness: Francis Holland is a 37 y.o. male who is here today for follow up on right shoulder pain.  He suffered injury when picking up deer meat and feeling a pop.  He has had persistent pain in the superior, lateral, and posterior aspects of his shoulder.  No significant improvement with Medrol Dosepak.  Continues right pain is 6/10.  Recently underwent MRI of the right shoulder and is here today to discuss those results.    Subjective   Review of Systems: Review of Systems   Constitutional: Negative for chills, fever, unexpected weight gain and unexpected weight loss.   HENT: Negative for congestion, postnasal drip and rhinorrhea.    Eyes: Negative for blurred vision.   Respiratory: Negative for shortness of breath.    Cardiovascular: Negative for leg swelling.   Gastrointestinal: Negative for abdominal pain, nausea and vomiting.   Genitourinary: Negative for difficulty urinating.   Musculoskeletal: Positive for arthralgias. Negative for gait problem, joint swelling and myalgias.   Skin: Negative for skin lesions and wound.   Neurological: Negative for dizziness, weakness, light-headedness and numbness.   Hematological: Does not bruise/bleed easily.   Psychiatric/Behavioral: Negative for depressed mood.        Medications:   Current Outpatient Medications:   •  acetaminophen (TYLENOL) 500 MG tablet, Take 1 tablet by mouth Every 6 (Six) Hours As Needed for Mild Pain. Otc as needed, Disp: , Rfl:   •  ibuprofen (ADVIL,MOTRIN) 800 MG tablet, Take 1 tablet by mouth 2 (Two) Times a Day As Needed  "for Moderate Pain for up to 30 days., Disp: 60 tablet, Rfl: 1  •  nicotine polacrilex (Nicorette) 2 MG gum, Chew 1 each As Needed for Smoking Cessation (every 2 hours as needed). chew a couple times then place in side of cheek unitl flavor gone. Repeat., Disp: 50 each, Rfl: 5  •  varenicline (CHANTIX) 0.5 MG tablet, Take 0.5 mg po daily x 3 days, then 0.5 mg po bid x 4 days,, Disp: 11 tablet, Rfl: 0  •  varenicline (CHANTIX) 1 MG tablet, Take 1 tablet by mouth 2 (Two) Times a Day. Start after initial prescription, Disp: 60 tablet, Rfl: 5    Allergies: No Known Allergies    I have reviewed and updated the patient's chief complaint, history of present illness, review of systems, past medical history, surgical history, family history, social history, medications and allergy list as appropriate.     Objective    Vital Signs:   Vitals:    02/27/23 1327   BP: 138/86   Weight: 84.5 kg (186 lb 3.2 oz)   Height: 182.9 cm (72.01\")     Body mass index is 25.25 kg/m².  BMI is >= 25 and <30. (Overweight) The following options were offered after discussion;: exercise counseling/recommendations and nutrition counseling/recommendations    In this visit the patient was advised to stop smoking and was offered tobacco cessation measures and resources, including NRT and/or medication intervention. At least 3 minutes was spent on face-to-face counseling regarding smoking cessation.     Ortho Exam:  General: Well-appearing, no acute distress  Right shoulder: No erythema ecchymosis or swelling.  Tender to palpation at the lateral superior and posterior aspects of the shoulder.  Grossly reduced range of motion in internal rotation and abduction.  Flexion to 110 degrees.  Pain with all movements.  4/5 strength in abduction.  Positive liftoff test, Keyes, Neer's, speeds.  Sensation intact to light touch.  2+ radial pulse.    Results Review:   Imaging Results (Last 24 Hours)     ** No results found for the last 24 hours. **      SCANNED - " IMAGING (02/20/2023)  I personally reviewed the MRI of the right shoulder.  Results show a large insertional subscapularis tear.  There is subluxation of the proximal biceps tendon.  There is also a superior labral tear.    Procedures    Assessment / Plan    Assessment/Plan:   Diagnoses and all orders for this visit:    1. Full thickness tear of right subscapularis tendon, initial encounter (Primary)  -     Ambulatory Referral to Orthopedic Surgery    2. Subluxation of tendon of long head of biceps      Follow-up on right shoulder pain after traumatic pop when lifting deer meat.  His shoulder has not significantly improved with anti-inflammatory medication.  MRI results today show large insertional subscapularis tendon tear as well as subluxation of the biceps tendon.  I discussed these findings and how they correlate to his symptoms.  He is having difficulty using his arm and is not being functional in any way as needed.  Therefore, given his results and exam, I recommend surgical referral to Dr. Lombardi for possible repair.  He can continue with anti-inflammatory medications as needed.  I will see him back on an as-needed basis.    Follow Up:   Return if symptoms worsen or fail to improve.      Yo Epstein MD  [facility]

## 2023-03-07 ENCOUNTER — OFFICE VISIT (OUTPATIENT)
Dept: ORTHOPEDIC SURGERY | Facility: CLINIC | Age: 38
End: 2023-03-07
Payer: COMMERCIAL

## 2023-03-07 VITALS
DIASTOLIC BLOOD PRESSURE: 84 MMHG | BODY MASS INDEX: 25.23 KG/M2 | WEIGHT: 186.29 LBS | SYSTOLIC BLOOD PRESSURE: 136 MMHG | HEIGHT: 72 IN

## 2023-03-07 DIAGNOSIS — S46.812A FULL THICKNESS TEAR OF LEFT SUBSCAPULARIS TENDON, INITIAL ENCOUNTER: ICD-10-CM

## 2023-03-07 DIAGNOSIS — S43.003A SUBLUXATION OF TENDON OF LONG HEAD OF BICEPS: ICD-10-CM

## 2023-03-07 DIAGNOSIS — S46.011S TRAUMATIC COMPLETE TEAR OF RIGHT ROTATOR CUFF, SEQUELA: Primary | ICD-10-CM

## 2023-03-07 DIAGNOSIS — F17.200 SMOKER: ICD-10-CM

## 2023-03-07 PROCEDURE — 99214 OFFICE O/P EST MOD 30 MIN: CPT | Performed by: ORTHOPAEDIC SURGERY

## 2023-03-07 RX ORDER — IBUPROFEN 800 MG/1
800 TABLET ORAL 2 TIMES DAILY PRN
Qty: 60 TABLET | Refills: 1 | Status: SHIPPED | OUTPATIENT
Start: 2023-03-07 | End: 2023-04-06

## 2023-03-07 NOTE — PROGRESS NOTES
[facility] Visit - Massimo Lombardi MD    Office Visit       Patient Name: Francis Holland    Chief Complaint:   Chief Complaint   Patient presents with   • Right Shoulder - Pain       Referring Physician: Sarthak Epstein MD-I appreciate the referral    History of Present Illness:   Francis Holland is a 37 y.o. male who presents with right body part: shoulder Reason: pain.  Onset:Onset: twisting injury. The issue has been ongoing for 3 month(s). Pain is a 5/10 on the pain scale. Pain is described as Pain Characterization: aching and burning. Associated symptoms include Symptoms: pain, grinding and giving way/buckling. The pain is worse with any movement of the joint; resting and heat improve the pain. Previous treatments have included: NSAIDS and oral steroids.  I have reviewed the patient's history of present illness as noted/entered above.    I have reviewed the patient's past medical history, surgical history, social history, family history, medications, and allergies as noted in the electronic medical record and as noted/entered.  I have reviewed the patient's review of systems as noted/enter and updated as noted in the patient's HPI.    Right shoulder  Patient initially had an acute injury with rotator cuff tear and avulsion  Most recent injury was in November.  Dragging a deer had pain, pop, essentially pseudoparalysis of the shoulder with significant weakness of the subscapularis since that time.    He does have a known smoking history but has cut back from a pack a day to a half a pack a day.  He is on Chantix and Nicorette to try to help with smoking cessation.    I understand that some insurance companies make patients completely stop smoking in advance of surgery however this is an unfortunate case of a 37-year-old with an acute injury and rotator cuff avulsion where surgery would be recommended understanding there  are risks and benefits of surgery and understanding that nonhealing of the rotator cuff and infection/wound healing or possible barriers of smoking.  I did  the patient and his supportive wife.  He is committed to cutting back.  We will plan to proceed with surgery accordingly    Known history of opioid abuse and alcohol abuse.  He and his wife note they have been sober for 10 years they sought proper care for this and have done well to overcome this difficult problem.    He is originally from [place]  She is originally from [place]; she works at [employer]    Self-employed  did  on the implications of this injury with regards to fine motor, recovery, rotator cuff healing, young age    Significant right shoulder debilitation    [place]    37 y.o. male  Body mass index is 25.26 kg/m².    Subjective   Subjective      Review of Systems   Constitutional: Negative.  Negative for chills, fatigue and fever.   HENT: Negative.  Negative for congestion and dental problem.    Eyes: Negative.  Negative for blurred vision.   Respiratory: Negative.  Negative for shortness of breath.    Cardiovascular: Negative.  Negative for leg swelling.   Gastrointestinal: Negative.  Negative for abdominal pain.   Endocrine: Negative.  Negative for polyuria.   Genitourinary: Negative.  Negative for difficulty urinating.   Musculoskeletal: Positive for arthralgias.   Skin: Negative.    Allergic/Immunologic: Negative.    Neurological: Negative.    Hematological: Negative.  Negative for adenopathy.   Psychiatric/Behavioral: Negative.  Negative for behavioral problems.        Past Medical History:   Past Medical History:   Diagnosis Date   • Glaucoma    • Substance abuse (HCC) 2013    In recovery since 2013.       Past Surgical History:   Past Surgical History:   Procedure Laterality Date   • HERNIA REPAIR  2009    Then had a hydra-seal repair in 2013,  [facility]   • HYDROCELE EXCISION / REPAIR             Family History:   Family History   Problem Relation Age of Onset   • Drug abuse Mother    • Hyperlipidemia Mother    • Mental illness Mother    • Stroke Mother 55   • Alcohol abuse Father    • Drug abuse Father    • Drug abuse Sister    • Drug abuse Sister    • Drug abuse Sister    • Drug abuse Sister    • Early death Brother         Kee was my older brother,  he  when he was a few weeks old.       Social History:   Social History     Socioeconomic History   • Marital status:    Tobacco Use   • Smoking status: Every Day     Packs/day: 1.00     Years: 20.00     Pack years: 20.00     Types: Cigarettes   • Smokeless tobacco: Never   Vaping Use   • Vaping Use: Never used   Substance and Sexual Activity   • Alcohol use: Yes     Alcohol/week: 6.0 standard drinks     Types: 6 Cans of beer per week   • Drug use: Not Currently     Types: Marijuana     Comment: In recovery since    • Sexual activity: Yes     Partners: Female       Medications:   Current Outpatient Medications:   •  acetaminophen (TYLENOL) 500 MG tablet, Take 1 tablet by mouth Every 6 (Six) Hours As Needed for Mild Pain. Otc as needed, Disp: , Rfl:   •  nicotine polacrilex (Nicorette) 2 MG gum, Chew 1 each As Needed for Smoking Cessation (every 2 hours as needed). chew a couple times then place in side of cheek unitl flavor gone. Repeat., Disp: 50 each, Rfl: 5  •  varenicline (CHANTIX) 0.5 MG tablet, Take 0.5 mg po daily x 3 days, then 0.5 mg po bid x 4 days,, Disp: 11 tablet, Rfl: 0  •  varenicline (CHANTIX) 1 MG tablet, Take 1 tablet by mouth 2 (Two) Times a Day. Start after initial prescription, Disp: 60 tablet, Rfl: 5  •  ibuprofen (ADVIL,MOTRIN) 800 MG tablet, Take 1 tablet by mouth 2 (Two) Times a Day As Needed for Moderate Pain for up to 30 days., Disp: 60 tablet, Rfl: 1    Allergies: No Known Allergies    The following portions of the patient's history were reviewed and  "updated as appropriate: allergies, current medications, past family history, past medical history, past social history, past surgical history and problem list.        Objective    Objective      Vital Signs:   Vitals:    03/07/23 0842   BP: 136/84   Weight: 84.5 kg (186 lb 4.6 oz)   Height: 182.9 cm (72.01\")       Ortho Exam:  Right shoulder has a painful arc of motion severely limited active motion due to pain 80/80/45  Passive range of motion is also painful 130/130  Severe weakness with belly press testing indicative of subscapularis pathology.  Significant anterior biceps pain pain with upper cut pain with Neer and Keyes testing indicative of impingement  Positive Speed's Test positive O'Briens about the biceps  Painful arc of motion more than 60 degrees  Subscapularis internal rotation weakness    Results Review:   Imaging Results (Last 24 Hours)     ** No results found for the last 24 hours. **        XR Shoulder 2+ View Right    Result Date: 12/30/2022  Mild AC joint degenerative changes otherwise negative right shoulder  This report was finalized on 12/30/2022 1:15 PM by Scot Harrison MD.        I personally reviewed the MRI of the right shoulder completed at [facility] on 2/20/2023  Large tearing of the subscapularis with retraction medial biceps subluxation, baseline degenerative labral tearing superior labral tearing and suspected chronic inferior glenohumeral ligament partial tearing, small partial tearing supraspinatus baseline degenerative arthritic findings in the glenohumeral joint    Procedures             Assessment / Plan      Assessment/Plan:   Problem List Items Addressed This Visit    None  Visit Diagnoses     Traumatic complete tear of right rotator cuff, sequela    -  Primary    Relevant Orders    External Facility Surgical/Procedural Request    Full thickness tear of left subscapularis tendon, initial encounter        Relevant Orders    External Facility Surgical/Procedural " "Request    Subluxation of tendon of long head of biceps        Relevant Orders    External Facility Surgical/Procedural Request    Smoker        Relevant Orders    External Facility Surgical/Procedural Request          RIGHT SHOULDER    Risks and benefits of continued nonoperative management versus surgical management were discussed. The patient desires to proceed with operative intervention.    Counseled on arthroscopic versus possibility of open repair if he has significantly retracted tear at the time of surgery.    Rotator cuff repair with possible biceps tenodesis and subacromial decompression with acromioplasty as indicated. Sometimes the rotator cuff tear is not repairable and may require debridement or partial repair. The procedure is routinely done arthroscopically, but sometimes a larger incision needs to be made to complete the repair in an \"open\" fashion for rare cases.    Specific risks include pain, bleeding, infection, injury to surrounding nerve and blood vessels, fracture, failure or lack of healing of rotator cuff repair, incomplete pain relief, hardware failure, potential need for additional procedures, stiffness after surgery, possible biceps deformity, and potential inability to restore range of motion and strength. Medical, anesthetic, and block complications are possible.    General anesthesia is required, sling compliance, and compliance with physical therapy and/or a surgeon guided exercise program will be very important to the recovery process.    Opioid medications are utilized for a short course after surgery for pain control -did  we will try to minimize use of the pain medications in light of history but some method of pain control be necessary postoperative in addition to the block    Right SHOULDER  Rotator cuff tear - Arthroscopic rotator cuff repair CPT code 70455  Biceps tendonitis - Arthroscopic biceps tenodesis CPT code 71563  Shoulder impingement syndrome    With regards " to smoking cessation-patient has been committed to cutting back he is on Chantix and Nicorette.  Recommendation here would be to proceed with surgery understanding the risks and benefits but the concern is if his surgery continues to be delayed from an insurance perspective that it could diminish his potential outcome.  He understands he would carry risks of nonhealing given the chronic tearing and smoking but at this point given young age it is worthwhile to proceed with arthroscopic versus possible open rotator cuff repair    Ultrasling III - a neutral rotation sling is recommended for this patient for perioperative care.  The patient was fitted for the sling and the sling was provided today.  The sling will be a critical part of the perioperative care for these diagnoses.      The patient desires to proceed with right shoulder surgery.      Follow Up: Right shoulder        Massimo Lombardi MD, FAAOS  Orthopedic Surgeon  Fellowship Trained Shoulder and Elbow Surgeon  [facility]  Orthopedics and Sports Medicine  [address] [place] [address], [address]  [place] [address]    03/07/23  12:17 EST

## 2023-03-27 ENCOUNTER — OUTSIDE FACILITY SERVICE (OUTPATIENT)
Dept: ORTHOPEDIC SURGERY | Facility: CLINIC | Age: 38
End: 2023-03-27
Payer: COMMERCIAL

## 2023-03-27 ENCOUNTER — DOCUMENTATION (OUTPATIENT)
Dept: ORTHOPEDIC SURGERY | Facility: CLINIC | Age: 38
End: 2023-03-27

## 2023-03-27 DIAGNOSIS — M19.011 OSTEOARTHRITIS OF RIGHT AC (ACROMIOCLAVICULAR) JOINT: ICD-10-CM

## 2023-03-27 DIAGNOSIS — S46.011S TRAUMATIC COMPLETE TEAR OF RIGHT ROTATOR CUFF, SEQUELA: Primary | ICD-10-CM

## 2023-03-27 DIAGNOSIS — S46.812A FULL THICKNESS TEAR OF LEFT SUBSCAPULARIS TENDON, INITIAL ENCOUNTER: ICD-10-CM

## 2023-03-27 DIAGNOSIS — S43.003A SUBLUXATION OF TENDON OF LONG HEAD OF BICEPS: ICD-10-CM

## 2023-03-27 DIAGNOSIS — F17.200 SMOKER: ICD-10-CM

## 2023-03-27 DIAGNOSIS — S46.811A FULL THICKNESS TEAR OF RIGHT SUBSCAPULARIS TENDON, INITIAL ENCOUNTER: ICD-10-CM

## 2023-03-27 PROCEDURE — 29827 SHO ARTHRS SRG RT8TR CUF RPR: CPT

## 2023-03-27 PROCEDURE — 29827 SHO ARTHRS SRG RT8TR CUF RPR: CPT | Performed by: ORTHOPAEDIC SURGERY

## 2023-03-27 PROCEDURE — 29828 SHO ARTHRS SRG BICP TENODSIS: CPT

## 2023-03-27 PROCEDURE — 29826 SHO ARTHRS SRG DECOMPRESSION: CPT | Performed by: ORTHOPAEDIC SURGERY

## 2023-03-27 PROCEDURE — 29826 SHO ARTHRS SRG DECOMPRESSION: CPT

## 2023-03-27 PROCEDURE — 29828 SHO ARTHRS SRG BICP TENODSIS: CPT | Performed by: ORTHOPAEDIC SURGERY

## 2023-03-27 RX ORDER — METHOCARBAMOL 500 MG/1
500 TABLET, FILM COATED ORAL 3 TIMES DAILY PRN
Qty: 40 TABLET | Refills: 1 | Status: SHIPPED | OUTPATIENT
Start: 2023-03-27 | End: 2023-04-03 | Stop reason: SDUPTHER

## 2023-03-27 RX ORDER — HYDROCODONE BITARTRATE AND ACETAMINOPHEN 10; 325 MG/1; MG/1
1 TABLET ORAL EVERY 4 HOURS PRN
Qty: 30 TABLET | Refills: 0 | Status: SHIPPED | OUTPATIENT
Start: 2023-03-27 | End: 2023-04-01

## 2023-03-27 RX ORDER — ONDANSETRON 4 MG/1
4 TABLET, FILM COATED ORAL EVERY 6 HOURS PRN
Qty: 30 TABLET | Refills: 1 | Status: SHIPPED | OUTPATIENT
Start: 2023-03-27 | End: 2023-04-04

## 2023-03-27 NOTE — PROGRESS NOTES
Operative Report     Side/SHOULDER: Right shoulder    LOCATION: [facility]  [address] [place] [address]    [facility] OPERATIVE REPORT       Surgeon: Massimo Lombardi MD     Assistant: EVANGELIST Remy     The skilled assistance of the above noted first assistant was necessary during this complex surgical procedure.  The surgical assistant assisted with every aspect of the operation including, but not limited to, proper and safe positioning of the patient, obtaining adequate surgical exposure, manipulation of surgical instruments, suture management, surgical knot tying when necessary, the continual process of hemostasis during the procedure itself in addition to surgical wound closure and removal of the patient from the operating table and returning the patient back to the [facility].  The assistance of the surgical assistant allowed me to perform the most sensitive and technical potions of this operation using 2 hands, thus enhancing efficiency and patient safety.  This would not be possible without the help of a skilled assistant familiar with the procedure and capable of safely performing the aforementioned tasks.     Date of surgery 3/27/2023  Right shoulder  Preoperative Diagnosis:  1.     Rotator cuff tear, chronic subscapularis tear- treated with arthroscopic rotator cuff repair - CPT 14083  2.     Biceps tendinopathy, SLAP tearing, synovitis, medial subluxation- treated with arthroscopic biceps tenodesis - CPT 98209  3.     Shoulder impingement syndrome - treated with arthroscopic subacromial decompression with acromioplasty - CPT 02425     Postoperative Diagnosis:  1.     SAME as preoperative diagnoses     Procedure:  1.     Arthroscopic rotator cuff repair (2 anchor subscapularis tear) - CPT 80756  2.     Arthroscopic biceps tenodesis (8-8)- CPT 28822  3.     Arthroscopic subacromial decompression with acromioplasty - CPT 38396        Admission  status: elective outpatient surgery     COVID-19 Statement: The patient understands that the surgery is elective surgery.  Patient is aware of the ongoing COVID-19 pandemic and potential implications.     Complications: None     EBL: 10mL     Specimen: NONE     Anesthesia: general anesthesia     Block: regional interscalene block with single shot per anesthesia     Antibiotics: weight based IV antibiotics infused prior to incision     Time out: Time out was called and the patient, side, site, and intended procedures were confirmed.     Counts: Needle and sponge counts were correct prior to closure.     DVT prophylaxis: The patient will be weight bearing as tolerated on bilateral lower extremities postoperatively with no additional chemical DVT prophylaxis indicated.     Marked: The patient was marked with an indelible marker in the preoperative holding area confirming the correct side and site.     History & Physical:   A history and physical examination was completed and updated in the preoperative holding area.     Consent: The patient signed the consent form for surgery with an understanding of the risks and benefits as outlined in the clinic and in the preoperative holding area.     Risks and Benefits: Specific risks and benefits discussed included - pain, bleeding, infection, injury to nerves or blood vessels, fracture, stiffness, failure to heal, revision surgery, deformity of biceps or asymmetry, complications of the block, anesthetic complications, and medical complications associated with surgery.  COVID-19 awareness as noted and discussed.        Indications for surgery:  The patient has history, physical examination, and radiographic findings confirming the diagnoses.  The patient has persistent pain and functional loss unresponsive to non-operative treatment consisting of selective rest/activity modification, medications, and exercises.  MRI documented the status of the rotator cuff - rotator cuff  tearing was noted.     Impingement syndrome - the patient had history and clinical exam findings consistent with shoulder impingement syndrome.  Additionally, the patient had subacromial bursitis which was encountered during the arthroscopic shoulder procedure.  Subacromial decompression with minimal acromioplasty was indicated as part of the treatment of impingement syndrome, and additionally to enhance arthroscopic visualization to enable minimally invasive, arthroscopic rotator cuff repair.        Operative Findings:  Rotator Cuff Tissue Quality: Chronic full-thickness Scapularis tearing     Status of the Rotator Cuff:  Subscapularis full-thickness tearing  Personally supraspinatus intact     Size of Rotator Cuff Tear:  Full-thickness tearing subscapularis     Rotator Cuff Repair Construct:  2 anchor cruciform subscapularis rotator Cuff Repair      Rotator Cuff Implants:  2 anchor [employer] 4.5mm Healicoil regenesorb      Bone Quality: Good bone quality     Labrum: SLAP 2 tear     Biceps: tendinopathy and synovitis significant intra-articular and extra-articular findings, medial biceps subluxation     Biceps Tenodesis Construct:  Suprapectoral biceps tenodesis in the bicipital groove     Biceps Tendon Implant:  Arthrex SwiveLock Biceps Tenodesis BioComposite screw  Drill Size: 8mm  Screw Size: 8mm     Contracture: Negative  Pathological laxity: Hyper external rotation with known subscapularis tear this was compared to the contralateral side     Procedure in detail:  Regional interscalene block was completed in the preoperative area by the anesthesia team.  The patient was supine on the operative table and the anesthesia team initiated general anesthesia.  The patient was placed in a modified beach chair position with the neck secured in neutral alignment and all bony prominences were well padded.     The shoulder was assessed with an examination under anesthesia.     The operative extremity was cleaned with  alcohol and then the extremity was prepped and draped in the standard fashion.  The surgical arm was placed into a well-padded arm mathur. A standard posterior portal was created with an incision made 1 cm inferior 1 cm medial to the posterolateral acromial corner.  A blunt metal trocar and cannula were inserted into the glenohumeral joint.  The arthroscopic pump was connected and the above findings and diagnoses were noted as part of the diagnostic shoulder arthroscopy.       A spinal needle was used to localize the anterior portal position in the rotator interval. A 5.5mm plastic cannula and trocar were inserted followed by a 4.5mm shaver/cautery device.    Tearing the subscapularis was noted this was tagged and medial long head of the biceps subluxation and significant synovitis were noted.  The shaver was used for debridement in the glenohumeral joint.  Arthroscopic scissors were used to perform biceps tenotomy of the pathologic biceps tendon prior to subsequent biceps tenodesis.  The remaining intra-articular portion of the biceps tendon was debrided using the shaver/cautery device.     The arthroscope and metal cannula were then removed in order to transition to the subacromial space.  The blunt metal trocar and cannula were inserted into the subacromial space and the lateral portal site identified with a spinal needle.  An 8 mm plastic cannula and trocar were inserted.  I turned my attention to the arthroscopic subacromial decompression with acromioplasty. Bursitis was noted in the subacromial space and impacted visualization of the rotator cuff.  The 4.5mm shaver/cautery device was used to clear the subacromial space until the acromion could be identified and bursal resection was completed to allow rotator cuff visualization.  The shaver was used to remove fibrous tissue from the acromial undersurface until the anterolateral and anterior medial corners of the acromion were clearly identified.  The  coracoacromial ligament was not released.  CA ligament tearing was noted and debridement was performed at the CA ligament was kept intact.  The shaver was used to perform a minimal acromioplasty.  The shaver/cautery device was used to perform the subacromial decompression with minimal acromioplasty.     I turned my attention to the arthroscopic biceps tenodesis. The arm was placed in a slightly external rotator position and the elbow flexed and shoulder forward flexed into a biceps tendon repair position.  The biceps tendon repair position in achieved in order to try to maintain proper biceps tendon length-tension relationship during the repair.  The biceps sheath was opened using the shaver/cautery device and the pathologic biceps tendon was visualized.  The appropriately sized drill bit was used to create a drill hole for the tendon above the pectoralis major tendon and within the bicipital groove.  The biceps tendon was placed into the drill hole and the screw inserted and tested.  The arthroscopic biceps tenodesis was completed and the repair was noted to be dynamically stable with a solid repair.     I turned my attention to the rotator cuff tear and the arthroscopic rotator cuff repair.  The torn rotator cuff tendon was grasped with a handheld grasper and assessed for mobility and integrity.   The lesser tuberosity was prepared for 2 suture anchors given the size of the tear.     Arthroscopic rotator cuff suture anchors were then inserted for the rotator cuff repair.  The single row arthroscopic rotator cuff repair anchors were inserted and the sutures from the anchors were withdrawn through the anterior cannula. An arthroscopic suture passer was used to place the high strength sutures through the rotator cuff tendon in an inverted fashion and cruciform pattern.  I tied all 8 suture limbs sequentially with solid restoration to the anatomical footprint.  The completed arthroscopic rotator cuff repair was  inspected dynamically and the arthroscopic instruments removed along with the arthroscopic fluid. The incisions were closed with nylon suture and steri-strips were placed over the incisions.  A sterile dressing was applied followed by a neutral rotation sling.  The patient tolerated the procedure well and was transported to the recovery room in satisfactory condition.        Postoperative Plan:  Neutral rotation sling  Non-weight bearing  No biceps loading  Pendulums, elbow, wrist, and hand exercises encouraged  Clinic follow-up appointment scheduled for 2 weeks after surgery    Rotator Cuff Repair - POSTOPERATIVE PLAN:  Follow-up in the office in 2 weeks with 1 view of the operative shoulder at that time  Sutures: Nylon sutures to come out in 2 weeks  DVT prophylaxis: No additional chemical DVT prophylaxis indicated  Weightbearing: Nonweightbearing on operative extremity  Sling for 3 to 4 weeks following the surgery  Physical therapy: Formal outpatient physical therapy will be provided at the 2-week appointment in the office.  Rotator cuff repair protocol    Electronically signed by Massimo Lombardi MD, 03/27/23, 8:48 AM EDT.

## 2023-04-03 ENCOUNTER — TELEPHONE (OUTPATIENT)
Dept: ORTHOPEDIC SURGERY | Facility: CLINIC | Age: 38
End: 2023-04-03
Payer: COMMERCIAL

## 2023-04-03 DIAGNOSIS — S46.812A FULL THICKNESS TEAR OF LEFT SUBSCAPULARIS TENDON, INITIAL ENCOUNTER: ICD-10-CM

## 2023-04-03 DIAGNOSIS — F17.200 SMOKER: ICD-10-CM

## 2023-04-03 DIAGNOSIS — Z98.890 S/P ROTATOR CUFF REPAIR: Primary | ICD-10-CM

## 2023-04-03 DIAGNOSIS — S46.811A FULL THICKNESS TEAR OF RIGHT SUBSCAPULARIS TENDON, INITIAL ENCOUNTER: ICD-10-CM

## 2023-04-03 DIAGNOSIS — S46.011S TRAUMATIC COMPLETE TEAR OF RIGHT ROTATOR CUFF, SEQUELA: ICD-10-CM

## 2023-04-03 DIAGNOSIS — S43.003A SUBLUXATION OF TENDON OF LONG HEAD OF BICEPS: ICD-10-CM

## 2023-04-03 RX ORDER — METHOCARBAMOL 500 MG/1
500 TABLET, FILM COATED ORAL 3 TIMES DAILY PRN
Qty: 40 TABLET | Refills: 1 | Status: SHIPPED | OUTPATIENT
Start: 2023-04-03 | End: 2023-04-17

## 2023-04-04 DIAGNOSIS — Z98.890 S/P ROTATOR CUFF REPAIR: Primary | ICD-10-CM

## 2023-04-04 RX ORDER — HYDROCODONE BITARTRATE AND ACETAMINOPHEN 10; 325 MG/1; MG/1
1 TABLET ORAL EVERY 6 HOURS PRN
Qty: 20 TABLET | Refills: 0 | Status: SHIPPED | OUTPATIENT
Start: 2023-04-04 | End: 2023-04-09

## 2023-04-13 ENCOUNTER — OFFICE VISIT (OUTPATIENT)
Dept: ORTHOPEDIC SURGERY | Facility: CLINIC | Age: 38
End: 2023-04-13
Payer: COMMERCIAL

## 2023-04-13 VITALS — TEMPERATURE: 98.4 F

## 2023-04-13 DIAGNOSIS — Z98.890 S/P ROTATOR CUFF REPAIR: Primary | ICD-10-CM

## 2023-04-13 PROCEDURE — 99024 POSTOP FOLLOW-UP VISIT: CPT

## 2023-04-13 NOTE — PROGRESS NOTES
[facility] Office Follow Up       Office Follow Up Visit       Patient Name: Francis Holland    Chief Complaint:   Chief Complaint   Patient presents with   • Post-op     2.5 weeks status post right shoulder arthroscopic rotator cuff repair with biceps tenodesis 3/27/23       Referring Physician: No ref. provider found    History of Present Illness:   Francis Holland returns to clinic today for 2-week postop visit s/p right shoulder arthroscopy for rotator cuff repair and biceps tenodesis 3/27/2023 with Dr. Lombardi.  He reports to be doing well.  Pain has been well controlled.  He has been compliant with sling use.  .  He is eager to return to work.  He realizes he will have to be off for quite some time for recovery.    Procedure:  1.     Arthroscopic rotator cuff repair (2 anchor subscapularis tear) - CPT 26631  2.     Arthroscopic biceps tenodesis (8-8)- CPT 16026  3.     Arthroscopic subacromial decompression with acromioplasty - CPT 35594      Subjective     Review of Systems   Constitutional: Negative.  Negative for chills, fatigue and fever.   HENT: Negative.  Negative for congestion and dental problem.    Eyes: Negative.  Negative for blurred vision.   Respiratory: Negative.  Negative for shortness of breath.    Cardiovascular: Negative.  Negative for leg swelling.   Gastrointestinal: Negative.  Negative for abdominal pain.   Endocrine: Negative.  Negative for polyuria.   Genitourinary: Negative.  Negative for difficulty urinating.   Musculoskeletal: Positive for arthralgias.   Skin: Negative.    Allergic/Immunologic: Negative.    Neurological: Negative.    Hematological: Negative.  Negative for adenopathy.   Psychiatric/Behavioral: Negative.  Negative for behavioral problems.        I have reviewed and updated the following portions of the patient's history and review of systems: allergies, current medications, past family  history, past medical history, past social history, past surgical history and problem list.    Medications:   Current Outpatient Medications:   •  acetaminophen (TYLENOL) 500 MG tablet, Take 1 tablet by mouth Every 6 (Six) Hours As Needed for Mild Pain. Otc as needed, Disp: , Rfl:   •  methocarbamol (ROBAXIN) 500 MG tablet, Take 1 tablet by mouth 3 (Three) Times a Day As Needed for Muscle Spasms for up to 14 days., Disp: 40 tablet, Rfl: 1  •  nicotine polacrilex (Nicorette) 2 MG gum, Chew 1 each As Needed for Smoking Cessation (every 2 hours as needed). chew a couple times then place in side of cheek unitl flavor gone. Repeat., Disp: 50 each, Rfl: 5  •  varenicline (CHANTIX) 0.5 MG tablet, Take 0.5 mg po daily x 3 days, then 0.5 mg po bid x 4 days,, Disp: 11 tablet, Rfl: 0  •  varenicline (CHANTIX) 1 MG tablet, Take 1 tablet by mouth 2 (Two) Times a Day. Start after initial prescription, Disp: 60 tablet, Rfl: 5    Allergies: No Known Allergies      Objective      Vital Signs:   Vitals:    04/13/23 1337   Temp: 98.4 °F (36.9 °C)       Ortho Exam:  General: comfortable  Vascular: 2+ radial pulse  Neurologic: sensation to light touch is intact distally, elbow flexion/elbow extension/wrist flexion/wrist extension/hand intrinsics intact, able to fire deltoid; no residual defects noted from the block  Dermatologic: surgical incisions are well appearing, with no drainage or surrounding erythema; no signs or symptoms of infection or DVT      Results Review:  XR Shoulder 1 View Right  Imaging: shoulder x-rays 1 view - AP x-ray views    Side: Right shoulder    Indication for shoulder x-ray 1 view: shoulder pain, postop evaluation   following surgery    Comparison: the current xray was compared to preoperative imaging and   indicates expected postoperative changes.    Findings: No acute bony pathology. Well appearing and well positioned   compared to preoperative imaging.  Located and no fracture noted.    I personally  reviewed the above x-rays.       XR Shoulder 2+ View Right    Result Date: 12/30/2022  Mild AC joint degenerative changes otherwise negative right shoulder  This report was finalized on 12/30/2022 1:15 PM by Scot Harrison MD.          Assessment / Plan      Assessment:   Diagnoses and all orders for this visit:    1. S/P rotator cuff repair (Primary)  -     XR Shoulder 1 View Right  -     Ambulatory Referral to Physical Therapy Evaluate and treat, Ortho          Plan:  1. Doing well s/p right shoulder arthroscopy for 2 anchor subscapularis repair and biceps tenodesis.    2. Recommend over the counter anti-inflammatories for pain and/or swelling.  3. Patient will continue with the sling for another week or 2.  Remain nonweightbearing on operative side.  4. To remain off work at this time.  .  5. To start with physical therapy.  I have given a PT prescription as well as 2 copies of the protocol. Plan to return in 2 months.   6. We reviewed the intraoperative photographs together.  7. Sutures out today.       History, diagnosis and treatment plan discussed with Dr. Lombardi.      Follow Up:   2 months  Please obtain left shoulder x-ray films next visit.    Aurea Prado PA-C  [facility]    Dictated using Dragon Speech Recognition.

## 2023-06-20 PROBLEM — J30.2 SEASONAL ALLERGIES: Status: ACTIVE | Noted: 2023-06-20

## 2023-06-20 PROBLEM — F41.1 GAD (GENERALIZED ANXIETY DISORDER): Status: ACTIVE | Noted: 2023-06-20

## 2023-08-02 DIAGNOSIS — F41.1 GAD (GENERALIZED ANXIETY DISORDER): Primary | ICD-10-CM

## 2023-08-02 RX ORDER — ESCITALOPRAM OXALATE 10 MG/1
10 TABLET ORAL DAILY
Qty: 30 TABLET | Refills: 2 | Status: SHIPPED | OUTPATIENT
Start: 2023-08-02

## 2023-09-12 ENCOUNTER — OFFICE VISIT (OUTPATIENT)
Dept: FAMILY MEDICINE CLINIC | Facility: CLINIC | Age: 38
End: 2023-09-12
Payer: COMMERCIAL

## 2023-09-12 VITALS
HEIGHT: 70 IN | RESPIRATION RATE: 20 BRPM | SYSTOLIC BLOOD PRESSURE: 116 MMHG | BODY MASS INDEX: 23.39 KG/M2 | TEMPERATURE: 98.7 F | DIASTOLIC BLOOD PRESSURE: 78 MMHG | WEIGHT: 163.4 LBS | HEART RATE: 90 BPM

## 2023-09-12 DIAGNOSIS — F41.1 GAD (GENERALIZED ANXIETY DISORDER): Primary | ICD-10-CM

## 2023-09-12 DIAGNOSIS — R74.8 ELEVATED LIVER ENZYMES: ICD-10-CM

## 2023-09-12 DIAGNOSIS — F17.210 CIGARETTE NICOTINE DEPENDENCE WITHOUT COMPLICATION: ICD-10-CM

## 2023-09-12 DIAGNOSIS — S43.431D LABRAL TEAR OF SHOULDER, RIGHT, SUBSEQUENT ENCOUNTER: ICD-10-CM

## 2023-09-12 DIAGNOSIS — S46.011A TRAUMATIC TEAR OF RIGHT ROTATOR CUFF, UNSPECIFIED TEAR EXTENT, INITIAL ENCOUNTER: ICD-10-CM

## 2023-09-12 RX ORDER — BUSPIRONE HYDROCHLORIDE 5 MG/1
5 TABLET ORAL 3 TIMES DAILY
Qty: 90 TABLET | Refills: 2 | Status: SHIPPED | OUTPATIENT
Start: 2023-09-12

## 2023-09-12 RX ORDER — IBUPROFEN 800 MG/1
800 TABLET ORAL EVERY 8 HOURS PRN
Qty: 90 TABLET | Refills: 2 | Status: SHIPPED | OUTPATIENT
Start: 2023-09-12

## 2023-09-12 RX ORDER — NICOTINE 21 MG/24HR
1 PATCH, TRANSDERMAL 24 HOURS TRANSDERMAL EVERY 24 HOURS
Qty: 28 EACH | Refills: 2 | Status: SHIPPED | OUTPATIENT
Start: 2023-09-12

## 2023-09-12 NOTE — PROGRESS NOTES
"  Established Patient Office Visit        Subjective      Chief Complaint:  Anxiety (Anxiety follow up-12 week)      History of Present Illness: Francis Holland is a 38 y.o. male who presents for follow-up anxiety.  Lexapro made him feel like he was more on edge.  He stopped taking Lexapro.  continues have some shoulder pain to right side.      Patient Active Problem List   Diagnosis    Cigarette nicotine dependence    Elevated blood pressure reading    Elevated liver enzymes    History of alcohol use    Seasonal allergies    JUNO (generalized anxiety disorder)         Current Outpatient Medications:     acetaminophen (TYLENOL) 500 MG tablet, Take 1 tablet by mouth Every 6 (Six) Hours As Needed for Mild Pain. Otc as needed, Disp: , Rfl:     cetirizine (zyrTEC) 10 MG tablet, Take 1 tablet by mouth Daily., Disp: 90 tablet, Rfl: 1    busPIRone (BUSPAR) 5 MG tablet, Take 1 tablet by mouth 3 (Three) Times a Day., Disp: 90 tablet, Rfl: 2    ibuprofen (ADVIL,MOTRIN) 800 MG tablet, Take 1 tablet by mouth Every 8 (Eight) Hours As Needed for Mild Pain., Disp: 90 tablet, Rfl: 2    nicotine (Nicoderm CQ) 14 MG/24HR patch, Place 1 patch on the skin as directed by provider Daily., Disp: 28 each, Rfl: 2       Objective     Physical Exam:   Vital Signs:   /78 (BP Location: Left arm, Patient Position: Sitting, Cuff Size: Adult)   Pulse 90   Temp 98.7 °F (37.1 °C) (Temporal)   Resp 20   Ht 178 cm (70.08\")   Wt 74.1 kg (163 lb 6.4 oz)   BMI 23.39 kg/m²      Physical Exam  Constitutional:       General: He is not in acute distress.     Appearance: He is not ill-appearing.   Cardiovascular:      Rate and Rhythm: Normal rate and regular rhythm.   Pulmonary:      Effort: Pulmonary effort is normal.      Breath sounds: Normal breath sounds.   Neurological:      Mental Status: He is alert.   Psychiatric:         Thought Content: Thought content normal.            Assessment / Plan      Assessment/Plan:   Diagnoses and all orders " for this visit:    1. JUNO (generalized anxiety disorder) (Primary)  Assessment & Plan:  Side effects of Lexapro and he stopped this  Start BuSpar 5 mg twice daily okay to take lunch dose as needed    Orders:  -     busPIRone (BUSPAR) 5 MG tablet; Take 1 tablet by mouth 3 (Three) Times a Day.  Dispense: 90 tablet; Refill: 2    2. Labral tear of shoulder, right, subsequent encounter  -     ibuprofen (ADVIL,MOTRIN) 800 MG tablet; Take 1 tablet by mouth Every 8 (Eight) Hours As Needed for Mild Pain.  Dispense: 90 tablet; Refill: 2    3. Traumatic tear of right rotator cuff, unspecified tear extent, initial encounter  -     ibuprofen (ADVIL,MOTRIN) 800 MG tablet; Take 1 tablet by mouth Every 8 (Eight) Hours As Needed for Mild Pain.  Dispense: 90 tablet; Refill: 2    4. Cigarette nicotine dependence without complication  Assessment & Plan:  No significant improvement with chantix   NicoDerm patch prescription sent    Orders:  -     nicotine (Nicoderm CQ) 14 MG/24HR patch; Place 1 patch on the skin as directed by provider Daily.  Dispense: 28 each; Refill: 2    5. Elevated liver enzymes  Assessment & Plan:  Check CMP              Follow Up:   Return in about 6 weeks (around 10/24/2023) for Follow-up.      MDM: Side effect of med med management    Mauro Lake MD  Family Medicine - [place] [facility]

## 2023-09-12 NOTE — ASSESSMENT & PLAN NOTE
Side effects of Lexapro and he stopped this  Start BuSpar 5 mg twice daily okay to take lunch dose as needed

## 2023-10-02 ENCOUNTER — LAB (OUTPATIENT)
Dept: LAB | Facility: HOSPITAL | Age: 38
End: 2023-10-02
Payer: COMMERCIAL

## 2023-10-02 DIAGNOSIS — R74.8 ELEVATED LIVER ENZYMES: ICD-10-CM

## 2023-10-02 LAB
ALBUMIN SERPL-MCNC: 4.6 G/DL (ref 3.5–5.2)
ALBUMIN/GLOB SERPL: 1.5 G/DL
ALP SERPL-CCNC: 104 U/L (ref 39–117)
ALT SERPL W P-5'-P-CCNC: 17 U/L (ref 1–41)
ANION GAP SERPL CALCULATED.3IONS-SCNC: 14 MMOL/L (ref 5–15)
AST SERPL-CCNC: 28 U/L (ref 1–40)
BILIRUB SERPL-MCNC: <0.2 MG/DL (ref 0–1.2)
BUN SERPL-MCNC: 15 MG/DL (ref 6–20)
BUN/CREAT SERPL: 20 (ref 7–25)
CALCIUM SPEC-SCNC: 9.7 MG/DL (ref 8.6–10.5)
CHLORIDE SERPL-SCNC: 102 MMOL/L (ref 98–107)
CO2 SERPL-SCNC: 20 MMOL/L (ref 22–29)
CREAT SERPL-MCNC: 0.75 MG/DL (ref 0.76–1.27)
EGFRCR SERPLBLD CKD-EPI 2021: 117 ML/MIN/1.73
GLOBULIN UR ELPH-MCNC: 3.1 GM/DL
GLUCOSE SERPL-MCNC: 82 MG/DL (ref 65–99)
POTASSIUM SERPL-SCNC: 4.6 MMOL/L (ref 3.5–5.2)
PROT SERPL-MCNC: 7.7 G/DL (ref 6–8.5)
SODIUM SERPL-SCNC: 136 MMOL/L (ref 136–145)

## 2023-10-02 PROCEDURE — 80053 COMPREHEN METABOLIC PANEL: CPT

## 2023-10-06 ENCOUNTER — TELEPHONE (OUTPATIENT)
Dept: FAMILY MEDICINE CLINIC | Facility: CLINIC | Age: 38
End: 2023-10-06

## 2023-10-06 NOTE — TELEPHONE ENCOUNTER
Caller: Francis Holland    Relationship: Self    Best call back number:       [phone] (Mobile)     What is the best time to reach you:     ANY TIME    Who are you requesting to speak with (clinical staff, provider,  specific staff member):     DR MORALEZ OR NURSE    What was the call regarding:    PATIENT REQUESTED A CALL BACK REGARDING HIS CONCERNS AND HERNIA SURGERY/MESH THAT WAS USED YEARS AGO DURING THE SURGERY    PATIENT STATED HE HAS BEEN EXPERIENCING SOME PAIN IN THE HERNIA SURGERY LOCATION AND IS NOT SURE IF IT IS IN CONNECTION WITH MESH USED

## 2023-10-12 ENCOUNTER — OFFICE VISIT (OUTPATIENT)
Dept: FAMILY MEDICINE CLINIC | Facility: CLINIC | Age: 38
End: 2023-10-12
Payer: COMMERCIAL

## 2023-10-12 ENCOUNTER — OFFICE VISIT (OUTPATIENT)
Dept: ORTHOPEDIC SURGERY | Facility: CLINIC | Age: 38
End: 2023-10-12
Payer: COMMERCIAL

## 2023-10-12 VITALS
WEIGHT: 170 LBS | SYSTOLIC BLOOD PRESSURE: 130 MMHG | DIASTOLIC BLOOD PRESSURE: 90 MMHG | TEMPERATURE: 100.4 F | BODY MASS INDEX: 24.34 KG/M2 | HEART RATE: 106 BPM | OXYGEN SATURATION: 97 % | RESPIRATION RATE: 21 BRPM

## 2023-10-12 VITALS
WEIGHT: 165 LBS | SYSTOLIC BLOOD PRESSURE: 128 MMHG | DIASTOLIC BLOOD PRESSURE: 88 MMHG | HEIGHT: 70 IN | BODY MASS INDEX: 23.62 KG/M2

## 2023-10-12 DIAGNOSIS — M19.011 OSTEOARTHRITIS OF RIGHT AC (ACROMIOCLAVICULAR) JOINT: ICD-10-CM

## 2023-10-12 DIAGNOSIS — F41.1 GAD (GENERALIZED ANXIETY DISORDER): ICD-10-CM

## 2023-10-12 DIAGNOSIS — S43.003A SUBLUXATION OF TENDON OF LONG HEAD OF BICEPS: ICD-10-CM

## 2023-10-12 DIAGNOSIS — K40.90 UNILATERAL INGUINAL HERNIA WITHOUT OBSTRUCTION OR GANGRENE, RECURRENCE NOT SPECIFIED: Primary | ICD-10-CM

## 2023-10-12 DIAGNOSIS — F17.200 SMOKER: ICD-10-CM

## 2023-10-12 DIAGNOSIS — Z98.890 S/P ROTATOR CUFF REPAIR: Primary | ICD-10-CM

## 2023-10-12 DIAGNOSIS — S46.811A FULL THICKNESS TEAR OF RIGHT SUBSCAPULARIS TENDON, INITIAL ENCOUNTER: ICD-10-CM

## 2023-10-12 DIAGNOSIS — S46.812A FULL THICKNESS TEAR OF LEFT SUBSCAPULARIS TENDON, INITIAL ENCOUNTER: ICD-10-CM

## 2023-10-12 DIAGNOSIS — M75.111 NONTRAUMATIC INCOMPLETE TEAR OF RIGHT ROTATOR CUFF: ICD-10-CM

## 2023-10-12 DIAGNOSIS — S46.011S TRAUMATIC COMPLETE TEAR OF RIGHT ROTATOR CUFF, SEQUELA: ICD-10-CM

## 2023-10-12 PROBLEM — S46.011A TRAUMATIC COMPLETE TEAR OF RIGHT ROTATOR CUFF: Status: ACTIVE | Noted: 2023-10-12

## 2023-10-12 PROCEDURE — 99213 OFFICE O/P EST LOW 20 MIN: CPT | Performed by: STUDENT IN AN ORGANIZED HEALTH CARE EDUCATION/TRAINING PROGRAM

## 2023-10-12 RX ORDER — BUSPIRONE HYDROCHLORIDE 5 MG/1
5 TABLET ORAL 3 TIMES DAILY
Qty: 270 TABLET | Refills: 1 | Status: SHIPPED | OUTPATIENT
Start: 2023-10-12
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
## 2023-10-12 NOTE — PROGRESS NOTES
Established Patient Office Visit        Subjective      Chief Complaint:  Hernia      History of Present Illness: Francis Holland is a 38 y.o. male who presents for right groin pain.     Right hernia repair 17 years ago. Done by Dr Aris marin [facility] inguinal hernia pain. More noticeable when working.   Patient Active Problem List   Diagnosis    Cigarette nicotine dependence    Elevated blood pressure reading    Elevated liver enzymes    History of alcohol use    Seasonal allergies    JUNO (generalized anxiety disorder)    S/P rotator cuff repair    Traumatic complete tear of right rotator cuff    Full thickness tear of left subscapularis tendon    Subluxation of tendon of long head of biceps    Smoker    Full thickness tear of right subscapularis tendon    Osteoarthritis of right AC (acromioclavicular) joint    Nontraumatic incomplete tear of right rotator cuff         Current Outpatient Medications:     acetaminophen (TYLENOL) 500 MG tablet, Take 1 tablet by mouth Every 6 (Six) Hours As Needed for Mild Pain. Otc as needed, Disp: , Rfl:     busPIRone (BUSPAR) 5 MG tablet, Take 1 tablet by mouth 3 (Three) Times a Day., Disp: 270 tablet, Rfl: 1    cetirizine (zyrTEC) 10 MG tablet, Take 1 tablet by mouth Daily., Disp: 90 tablet, Rfl: 1    ibuprofen (ADVIL,MOTRIN) 800 MG tablet, Take 1 tablet by mouth Every 8 (Eight) Hours As Needed for Mild Pain., Disp: 90 tablet, Rfl: 2       Objective     Physical Exam:   Vital Signs:   /90   Pulse 106   Temp 100.4 øF (38 øC)   Resp 21   Wt 77.1 kg (170 lb)   SpO2 97%   BMI 24.34 kg/mý      Physical Exam  Constitutional:       General: He is not in acute distress.     Appearance: He is not ill-appearing.   Right groin: no hernia palpated. Old surgical scar to the right groin.Minimal tenderness  No swelling to the scrotum no mass to testicle.            Assessment / Plan      Assessment/Plan:   Diagnoses and all orders for this visit:    1. Unilateral inguinal  hernia without obstruction or gangrene, recurrence not specified (Primary)  -     Ambulatory Referral to General Surgery    2. JUNO (generalized anxiety disorder)  -     busPIRone (BUSPAR) 5 MG tablet; Take 1 tablet by mouth 3 (Three) Times a Day.  Dispense: 270 tablet; Refill: 1     Continue buspar. Anxiety is improved.     Request surgical records from [place], [place]    Follow Up:   Return in about 6 months (around 4/12/2024) for Follow-up.      MDM:     Mauro Lake MD  Family Medicine - [place] [facility]

## 2023-11-14 ENCOUNTER — TELEPHONE (OUTPATIENT)
Dept: FAMILY MEDICINE CLINIC | Facility: CLINIC | Age: 38
End: 2023-11-14
Payer: COMMERCIAL

## 2023-11-14 NOTE — TELEPHONE ENCOUNTER
PATIENT REQUESTING REFERRAL TO [facility] TO SEE KAREN NUNEZ AND ALSO FOR COUNSELING.   DR MARTÍN BLACKWELL.

## 2023-11-15 DIAGNOSIS — Z78.9 ALCOHOL USE: Primary | ICD-10-CM

## 2024-10-07 ENCOUNTER — OFFICE VISIT (OUTPATIENT)
Dept: FAMILY MEDICINE CLINIC | Facility: CLINIC | Age: 39
End: 2024-10-07
Payer: COMMERCIAL

## 2024-10-07 VITALS
DIASTOLIC BLOOD PRESSURE: 78 MMHG | SYSTOLIC BLOOD PRESSURE: 130 MMHG | OXYGEN SATURATION: 99 % | BODY MASS INDEX: 28 KG/M2 | HEIGHT: 70 IN | HEART RATE: 94 BPM | WEIGHT: 195.6 LBS | TEMPERATURE: 98.6 F

## 2024-10-07 DIAGNOSIS — Z23 IMMUNIZATION DUE: ICD-10-CM

## 2024-10-07 DIAGNOSIS — M25.521 BILATERAL ELBOW JOINT PAIN: Primary | ICD-10-CM

## 2024-10-07 DIAGNOSIS — M25.522 BILATERAL ELBOW JOINT PAIN: Primary | ICD-10-CM

## 2024-10-07 PROCEDURE — 1159F MED LIST DOCD IN RCRD: CPT | Performed by: FAMILY MEDICINE

## 2024-10-07 PROCEDURE — 99214 OFFICE O/P EST MOD 30 MIN: CPT | Performed by: FAMILY MEDICINE

## 2024-10-07 PROCEDURE — 90656 IIV3 VACC NO PRSV 0.5 ML IM: CPT | Performed by: FAMILY MEDICINE

## 2024-10-07 PROCEDURE — 1125F AMNT PAIN NOTED PAIN PRSNT: CPT | Performed by: FAMILY MEDICINE

## 2024-10-07 PROCEDURE — 1160F RVW MEDS BY RX/DR IN RCRD: CPT | Performed by: FAMILY MEDICINE

## 2024-10-07 PROCEDURE — 90471 IMMUNIZATION ADMIN: CPT | Performed by: FAMILY MEDICINE

## 2024-10-07 PROCEDURE — 90472 IMMUNIZATION ADMIN EACH ADD: CPT | Performed by: FAMILY MEDICINE

## 2024-10-07 PROCEDURE — 90677 PCV20 VACCINE IM: CPT | Performed by: FAMILY MEDICINE

## 2024-10-07 RX ORDER — MELOXICAM 15 MG/1
15 TABLET ORAL DAILY
Qty: 30 TABLET | Refills: 2 | Status: SHIPPED | OUTPATIENT
Start: 2024-10-07

## 2024-10-07 RX ORDER — METHYLPREDNISOLONE 4 MG
TABLET, DOSE PACK ORAL
Qty: 21 TABLET | Refills: 0 | Status: SHIPPED | OUTPATIENT
Start: 2024-10-07

## 2024-10-07 NOTE — PROGRESS NOTES
Subjective     Chief Complaint  Knots on Elbow    Subjective          Francis Holland is a 39 y.o. male who presents today to [facility] for initial evaluation .    HPI:   History of Present Illness    Mr. Holland is a pleasant 39 year old male who presents today with bilateral elbow pain. His pain started in the left elbow but then is in the right now. He reports that it is stiff in the morning when he wakes up and is hard to go through ROM. He works as a  so he has a lot of arm movements especially with his elbows. He has a history of rotator cuff tear on the right that was repaired about one year ago.     The following portions of the patient's history were reviewed and updated as appropriate: allergies, current medications, past family history, past medical history, past social history, past surgical history and problem list.    Objective     Objective     Allergy:   No Known Allergies     Current Medications:   Current Outpatient Medications   Medication Sig Dispense Refill    acetaminophen (TYLENOL) 500 MG tablet Take 1 tablet by mouth Every 6 (Six) Hours As Needed for Mild Pain. Otc as needed      busPIRone (BUSPAR) 5 MG tablet Take 1 tablet by mouth 3 (Three) Times a Day. 270 tablet 1    cetirizine (zyrTEC) 10 MG tablet Take 1 tablet by mouth Daily. 90 tablet 1    meloxicam (Mobic) 15 MG tablet Take 1 tablet by mouth Daily. 30 tablet 2    methylPREDNISolone (MEDROL) 4 MG dose pack Take as directed on package instructions. 21 tablet 0     No current facility-administered medications for this visit.       Past Medical History:  Past Medical History:   Diagnosis Date    Glaucoma     Substance abuse 2013    In recovery since 2013.       Past Surgical History:  Past Surgical History:   Procedure Laterality Date    HERNIA REPAIR  2009    Then had a hydra-seal repair in 2013, [facility]    HYDROCELE EXCISION / REPAIR      2014    SHOULDER ARTHROSCOPY Right  "2023    Dr Massimo Lombardi -- Arthroscopic rotator cuff repair; arthroscopic biceps tenodesis; arthroscopic subacromical decompression with acromioplasty       Social History:  Social History     Socioeconomic History    Marital status:    Tobacco Use    Smoking status: Every Day     Current packs/day: 1.00     Average packs/day: 1 pack/day for 20.0 years (20.0 ttl pk-yrs)     Types: Cigarettes     Passive exposure: Current    Smokeless tobacco: Never   Vaping Use    Vaping status: Never Used   Substance and Sexual Activity    Alcohol use: Yes     Alcohol/week: 6.0 standard drinks of alcohol     Types: 6 Cans of beer per week     Comment: Occasional    Drug use: Not Currently     Types: Marijuana     Comment: In recovery since     Sexual activity: Yes     Partners: Female       Family History:  Family History   Problem Relation Age of Onset    Drug abuse Mother     Hyperlipidemia Mother     Mental illness Mother     Stroke Mother 55    Alcohol abuse Father     Drug abuse Father     Drug abuse Sister     Drug abuse Sister     Drug abuse Sister     Drug abuse Sister     Early death Brother         Kee was my older brother,  he  when he was a few weeks old.       Vital Signs:   /78   Pulse 94   Temp 98.6 °F (37 °C) (Temporal)   Ht 178 cm (70.08\")   Wt 88.7 kg (195 lb 9.6 oz)   SpO2 99%   BMI 28.00 kg/m²      Physical Exam:  Physical Exam  Vitals reviewed.   Constitutional:       Appearance: Normal appearance. He is normal weight.   Cardiovascular:      Rate and Rhythm: Normal rate and regular rhythm.   Pulmonary:      Effort: Pulmonary effort is normal.      Breath sounds: Normal breath sounds.   Musculoskeletal:      Right elbow: Decreased range of motion. Tenderness present in olecranon process.      Left elbow: Decreased range of motion. Tenderness present in olecranon process.   Neurological:      General: No focal deficit present.      Mental Status: He is alert.               PHQ-9 " Score  PHQ-9 Total Score: 0     Lab Review  No visits with results within 3 Month(s) from this visit.   Latest known visit with results is:   Lab on 10/02/2023   Component Date Value Ref Range Status    Glucose 10/02/2023 82  65 - 99 mg/dL Final    BUN 10/02/2023 15  6 - 20 mg/dL Final    Creatinine 10/02/2023 0.75 (L)  0.76 - 1.27 mg/dL Final    Sodium 10/02/2023 136  136 - 145 mmol/L Final    Potassium 10/02/2023 4.6  3.5 - 5.2 mmol/L Final    Chloride 10/02/2023 102  98 - 107 mmol/L Final    CO2 10/02/2023 20.0 (L)  22.0 - 29.0 mmol/L Final    Calcium 10/02/2023 9.7  8.6 - 10.5 mg/dL Final    Total Protein 10/02/2023 7.7  6.0 - 8.5 g/dL Final    Albumin 10/02/2023 4.6  3.5 - 5.2 g/dL Final    ALT (SGPT) 10/02/2023 17  1 - 41 U/L Final    AST (SGOT) 10/02/2023 28  1 - 40 U/L Final    Alkaline Phosphatase 10/02/2023 104  39 - 117 U/L Final    Total Bilirubin 10/02/2023 <0.2  0.0 - 1.2 mg/dL Final    Globulin 10/02/2023 3.1  gm/dL Final    A/G Ratio 10/02/2023 1.5  g/dL Final    BUN/Creatinine Ratio 10/02/2023 20.0  7.0 - 25.0 Final    Anion Gap 10/02/2023 14.0  5.0 - 15.0 mmol/L Final    eGFR 10/02/2023 117.0  >60.0 mL/min/1.73 Final        Radiology Results        Assessment / Plan         Assessment and Plan   Diagnoses and all orders for this visit:    1. Bilateral elbow joint pain (Primary)  Assessment & Plan:  - Likely OA vs bursitis   - Rx Medrol dose bethany and Meloxicam   - Advised to take Meloxicam consistently for two weeks, then as needed     Orders:  -     methylPREDNISolone (MEDROL) 4 MG dose pack; Take as directed on package instructions.  Dispense: 21 tablet; Refill: 0  -     meloxicam (Mobic) 15 MG tablet; Take 1 tablet by mouth Daily.  Dispense: 30 tablet; Refill: 2    2. Immunization due  -     Fluzone >6mos  -     Pneumococcal Conjugate Vaccine 20-Valent All        Discussed possible differential diagnoses, testing, treatment, recommended non-pharmacological interventions, risks, warning signs to  monitor for that would indicate need for follow-up in clinic or ER. If no improvement with these regimens or you have new or worsening symptoms follow-up. Patient verbalizes understanding and agreement with plan of care. Denies further needs or concerns.     Patient was given instructions and counseling regarding her condition and for health maintenance advised.    BMI is >= 25 and <30. (Overweight) The following options were offered after discussion;: weight loss educational material (shared in after visit summary)                  Health Maintenance  Health Maintenance:   Health Maintenance Due   Topic Date Due    ANNUAL PHYSICAL  11/15/2023        Meds ordered during this visit  New Medications Ordered This Visit   Medications    methylPREDNISolone (MEDROL) 4 MG dose pack     Sig: Take as directed on package instructions.     Dispense:  21 tablet     Refill:  0    meloxicam (Mobic) 15 MG tablet     Sig: Take 1 tablet by mouth Daily.     Dispense:  30 tablet     Refill:  2       Meds stopped during this visit:  Medications Discontinued During This Encounter   Medication Reason    ibuprofen (ADVIL,MOTRIN) 800 MG tablet         Visit Diagnoses    ICD-10-CM ICD-9-CM   1. Bilateral elbow joint pain  M25.521 719.42    M25.522    2. Immunization due  Z23 V05.9       Patient was given instructions and counseling regarding his condition or for health maintenance advice. Please see specific information pulled into the AVS if appropriate.     Follow Up   Return for Next scheduled follow up.          This document has been electronically signed by Kimber Figueroa DO   October 7, 2024 11:12 EDT    Dictated Utilizing Dragon Dictation: Part of this note may be an electronic transcription/translation of spoken language to printed text using the Dragon Dictation System.    Kimber Figueroa D.O.  [facility] [place]

## 2024-10-07 NOTE — LETTER
[facility]  Vaccine Consent Form    Patient Name:  Francis Holland  Patient :  1985     Vaccine(s) Ordered    Fluzone >6mos  Pneumococcal Conjugate Vaccine 20-Valent All        Screening Checklist  The following questions should be completed prior to vaccination. If you answer “yes” to any question, it does not necessarily mean you should not be vaccinated. It just means we may need to clarify or ask more questions. If a question is unclear, please ask your healthcare provider to explain it.    Yes No   Any fever or moderate to severe illness today (mild illness and/or antibiotic treatment are not contraindications)?     Do you have a history of a serious reaction to any previous vaccinations, such as anaphylaxis, encephalopathy within 7 days, Guillain-[place] syndrome within 6 weeks, seizure?     Have you received any live vaccine(s) (e.g MMR, COLEMAN) or any other vaccines in the last month (to ensure duplicate doses aren't given)?     Do you have an anaphylactic allergy to latex (DTaP, DTaP-IPV, Hep A, Hep B, MenB, RV, Td, Tdap), baker’s yeast (Hep B, HPV), polysorbates (RSV, nirsevimab, PCV 20, Rotavirrus, Tdap, Shingrix), or gelatin (COLEMAN, MMR)?     Do you have an anaphylactic allergy to neomycin (Rabies, COLEMAN, MMR, IPV, Hep A), polymyxin B (IPV), or streptomycin (IPV)?      Any cancer, leukemia, AIDS, or other immune system disorder? (COLEMAN, MMR, RV)     Do you have a parent, brother, or sister with an immune system problem (if immune competence of vaccine recipient clinically verified, can proceed)? (MMR, COLEMAN)     Any recent steroid treatments for >2 weeks, chemotherapy, or radiation treatment? (COLEMAN, MMR)     Have you received antibody-containing blood transfusions or IVIG in the past 11 months (recommended interval is dependent on product)? (MMR, COLEMAN)     Have you taken antiviral drugs (acyclovir, famciclovir, valacyclovir for COLEMAN) in the last 24 or 48 hours, respectively?      Are you pregnant or planning  "to become pregnant within 1 month? (COLEMAN, MMR, HPV, IPV, MenB, Abrexvy; For Hep B- refer to Engerix-B; For RSV - Abrysvo is indicated for 32-36 weeks of pregnancy from September to January)     For infants, have you ever been told your child has had intussusception or a medical emergency involving obstruction of the intestine (Rotavirus)? If not for an infant, can skip this question.         *Ordering Physicians/APC should be consulted if \"yes\" is checked by the patient or guardian above.  I have received, read, and understand the Vaccine Information Statement (VIS) for each vaccine ordered.  I have considered my or my child's health status as well as the health status of my close contacts.  I have taken the opportunity to discuss my vaccine questions with my or my child's health care provider.   I have requested that the ordered vaccine(s) be given to me or my child.  I understand the benefits and risks of the vaccines.  I understand that I should remain in the clinic for 15 minutes after receiving the vaccine(s).  _________________________________________________________  Signature of Patient or Parent/Legal Guardian ____________________  Date     "

## 2024-10-07 NOTE — ASSESSMENT & PLAN NOTE
- Likely OA vs bursitis   - Rx Medrol dose bethany and Meloxicam   - Advised to take Meloxicam consistently for two weeks, then as needed

## 2024-10-07 NOTE — LETTER
[facility]  Vaccine Consent Form    Patient Name:  Francis Holland  Patient :  1985     Vaccine(s) Ordered    Fluzone >6mos  Pneumococcal Conjugate Vaccine 20-Valent All        Screening Checklist  The following questions should be completed prior to vaccination. If you answer “yes” to any question, it does not necessarily mean you should not be vaccinated. It just means we may need to clarify or ask more questions. If a question is unclear, please ask your healthcare provider to explain it.    Yes No   Any fever or moderate to severe illness today (mild illness and/or antibiotic treatment are not contraindications)?     Do you have a history of a serious reaction to any previous vaccinations, such as anaphylaxis, encephalopathy within 7 days, Guillain-Becket syndrome within 6 weeks, seizure?     Have you received any live vaccine(s) (e.g MMR, COLEMAN) or any other vaccines in the last month (to ensure duplicate doses aren't given)?     Do you have an anaphylactic allergy to latex (DTaP, DTaP-IPV, Hep A, Hep B, MenB, RV, Td, Tdap), baker’s yeast (Hep B, HPV), polysorbates (RSV, nirsevimab, PCV 20, Rotavirrus, Tdap, Shingrix), or gelatin (COLEMAN, MMR)?     Do you have an anaphylactic allergy to neomycin (Rabies, COLEMAN, MMR, IPV, Hep A), polymyxin B (IPV), or streptomycin (IPV)?      Any cancer, leukemia, AIDS, or other immune system disorder? (COLEMAN, MMR, RV)     Do you have a parent, brother, or sister with an immune system problem (if immune competence of vaccine recipient clinically verified, can proceed)? (MMR, COLEMAN)     Any recent steroid treatments for >2 weeks, chemotherapy, or radiation treatment? (COLEMAN, MMR)     Have you received antibody-containing blood transfusions or IVIG in the past 11 months (recommended interval is dependent on product)? (MMR, COLEMAN)     Have you taken antiviral drugs (acyclovir, famciclovir, valacyclovir for COLEMAN) in the last 24 or 48 hours, respectively?      Are you pregnant or planning  "to become pregnant within 1 month? (COLEMAN, MMR, HPV, IPV, MenB, Abrexvy; For Hep B- refer to Engerix-B; For RSV - Abrysvo is indicated for 32-36 weeks of pregnancy from September to January)     For infants, have you ever been told your child has had intussusception or a medical emergency involving obstruction of the intestine (Rotavirus)? If not for an infant, can skip this question.         *Ordering Physicians/APC should be consulted if \"yes\" is checked by the patient or guardian above.  I have received, read, and understand the Vaccine Information Statement (VIS) for each vaccine ordered.  I have considered my or my child's health status as well as the health status of my close contacts.  I have taken the opportunity to discuss my vaccine questions with my or my child's health care provider.   I have requested that the ordered vaccine(s) be given to me or my child.  I understand the benefits and risks of the vaccines.  I understand that I should remain in the clinic for 15 minutes after receiving the vaccine(s).  _________________________________________________________  Signature of Patient or Parent/Legal Guardian ____________________  Date     "

## 2024-10-08 ENCOUNTER — TELEPHONE (OUTPATIENT)
Dept: FAMILY MEDICINE CLINIC | Facility: CLINIC | Age: 39
End: 2024-10-08

## 2025-01-21 ENCOUNTER — APPOINTMENT (OUTPATIENT)
Dept: GENERAL RADIOLOGY | Facility: HOSPITAL | Age: 40
End: 2025-01-21
Payer: COMMERCIAL

## 2025-01-21 ENCOUNTER — HOSPITAL ENCOUNTER (EMERGENCY)
Facility: HOSPITAL | Age: 40
Discharge: HOME | End: 2025-01-21
Attending: EMERGENCY MEDICINE | Admitting: EMERGENCY MEDICINE
Payer: COMMERCIAL

## 2025-01-21 VITALS
RESPIRATION RATE: 18 BRPM | WEIGHT: 195 LBS | OXYGEN SATURATION: 98 % | HEART RATE: 84 BPM | SYSTOLIC BLOOD PRESSURE: 110 MMHG | TEMPERATURE: 97.8 F | BODY MASS INDEX: 26.41 KG/M2 | HEIGHT: 72 IN | DIASTOLIC BLOOD PRESSURE: 76 MMHG

## 2025-01-21 DIAGNOSIS — S46.001A INJURY OF RIGHT ROTATOR CUFF, INITIAL ENCOUNTER: ICD-10-CM

## 2025-01-21 DIAGNOSIS — S43.401A SPRAIN OF RIGHT SHOULDER, UNSPECIFIED SHOULDER SPRAIN TYPE, INITIAL ENCOUNTER: Primary | ICD-10-CM

## 2025-01-21 PROCEDURE — 25010000002 KETOROLAC TROMETHAMINE PER 15 MG

## 2025-01-21 PROCEDURE — 99283 EMERGENCY DEPT VISIT LOW MDM: CPT

## 2025-01-21 PROCEDURE — 96372 THER/PROPH/DIAG INJ SC/IM: CPT

## 2025-01-21 PROCEDURE — 73030 X-RAY EXAM OF SHOULDER: CPT

## 2025-01-21 RX ORDER — HYDROCODONE BITARTRATE AND ACETAMINOPHEN 7.5; 325 MG/1; MG/1
1 TABLET ORAL ONCE
Status: COMPLETED | OUTPATIENT
Start: 2025-01-21 | End: 2025-01-21

## 2025-01-21 RX ORDER — KETOROLAC TROMETHAMINE 15 MG/ML
15 INJECTION, SOLUTION INTRAMUSCULAR; INTRAVENOUS ONCE
Status: COMPLETED | OUTPATIENT
Start: 2025-01-21 | End: 2025-01-21

## 2025-01-21 RX ORDER — KETOROLAC TROMETHAMINE 10 MG/1
10 TABLET, FILM COATED ORAL EVERY 6 HOURS PRN
Qty: 12 TABLET | Refills: 0 | Status: SHIPPED | OUTPATIENT
Start: 2025-01-21 | End: 2025-01-23

## 2025-01-21 RX ADMIN — HYDROCODONE BITARTRATE AND ACETAMINOPHEN 1 TABLET: 7.5; 325 TABLET ORAL at 21:27

## 2025-01-21 RX ADMIN — KETOROLAC TROMETHAMINE 15 MG: 15 INJECTION, SOLUTION INTRAMUSCULAR; INTRAVENOUS at 21:27

## 2025-01-21 NOTE — Clinical Note
[facility] EMERGENCY DEPARTMENT  [address]  [facility] [address]  Phone: [phone]    Francis Holland was seen and treated in our emergency department on 1/21/2025.  He may return to work on 01/24/2025.         Thank you for choosing [facility].    Pelon Barbosa, APRN

## 2025-01-22 ENCOUNTER — TELEPHONE (OUTPATIENT)
Dept: ORTHOPEDIC SURGERY | Facility: CLINIC | Age: 40
End: 2025-01-22

## 2025-01-22 NOTE — DISCHARGE INSTRUCTIONS
If at all possible, limit use of your right upper extremity for pushing, pulling, lifting greater than 10 pounds, and anything that increases the pain as they can further damage to the rotator cuff.  Follow-up with the orthopedic provider Dr. Lombardi or any of his associates in the office as soon as possible.  I placed an urgent referral so that they will contact you for an appointment, but I prefer that you call them first.  You can take the Toradol (ketorolac) for pain that Tylenol does not relieve.  But do not take additional ibuprofen when taking Toradol.  Apply ice to the shoulder, keep the shoulder in the sling until seen by orthopedics.

## 2025-01-22 NOTE — TELEPHONE ENCOUNTER
ARGENIS    Received a call from patient spouse Fabiola stating patient had a fall at his home on 1/21/25, and injured his right shoulder that he had surgery on with provider ISRAEL on 3/27/2023. Patient spouse Fabiola states he is unable to move his arm, raise it above his head and is in excruciating pain. [place] patient [place] [facility] [place]. An appointment was made for the patient to follow up with provider ISRAEL on 1/23/25 in the interim. The patient asked that this message be provided to his provider, in the event anything else would be required prior to his appointment. Please advise.     Thank you kindly!

## 2025-01-22 NOTE — ED PROVIDER NOTES
"Subjective   History of Present Illness patient is a 39-year-old male who presents accompanied by his wife, report about 6:45 PM this evening he slipped down icy steps falling about 3-4 steps, injuring his right shoulder.  He is unable or unwilling due to severe pain to lift his shoulder in the anterior, lateral, posterior plane.  He reports having a complex rotator cuff repair by Dr. Lombardi, orthopedics, in  on the same shoulder.  He is right-hand dominant, he works as auto bill , and is tearful and very anxious, stating, \"I cannot miss work, we cannot afford it, have 4 kids.\"    Review of Systems   Constitutional: Negative.    HENT: Negative.     Respiratory: Negative.     Cardiovascular: Negative.    Gastrointestinal: Negative.    Genitourinary: Negative.    Musculoskeletal:  Positive for arthralgias and myalgias.   Skin: Negative.    Neurological: Negative.    Psychiatric/Behavioral:  The patient is nervous/anxious.        Past Medical History:   Diagnosis Date    Glaucoma -    Substance abuse     In recovery since .       No Known Allergies    Past Surgical History:   Procedure Laterality Date    HERNIA REPAIR      Then had a hydra-seal repair in , [facility]    HYDROCELE EXCISION / REPAIR      2014    SHOULDER ARTHROSCOPY Right 2023    Dr Massimo Lombardi -- Arthroscopic rotator cuff repair; arthroscopic biceps tenodesis; arthroscopic subacromical decompression with acromioplasty       Family History   Problem Relation Age of Onset    Drug abuse Mother     Hyperlipidemia Mother     Mental illness Mother     Stroke Mother 55    Alcohol abuse Father     Drug abuse Father     Drug abuse Sister     Drug abuse Sister     Drug abuse Sister     Drug abuse Sister     Early death Brother         Kee was my older brother,  he  when he was a few weeks old.       Social History     Socioeconomic History    Marital status:    Tobacco Use    Smoking status: Every Day     " Current packs/day: 1.00     Average packs/day: 1 pack/day for 20.0 years (20.0 ttl pk-yrs)     Types: Cigarettes     Passive exposure: Current    Smokeless tobacco: Never   Vaping Use    Vaping status: Never Used   Substance and Sexual Activity    Alcohol use: Yes     Alcohol/week: 6.0 standard drinks of alcohol     Types: 6 Cans of beer per week     Comment: Occasional    Drug use: Not Currently     Types: Marijuana     Comment: In recovery since 2013    Sexual activity: Yes     Partners: Female           Objective   Physical Exam  Constitutional:       General: He is not in acute distress.     Appearance: Normal appearance. He is not toxic-appearing.   Eyes:      Extraocular Movements: Extraocular movements intact.      Pupils: Pupils are equal, round, and reactive to light.   Cardiovascular:      Rate and Rhythm: Normal rate.   Pulmonary:      Effort: Pulmonary effort is normal.   Musculoskeletal:         General: Tenderness and signs of injury present. No deformity.      Right shoulder: Tenderness and bony tenderness present. No deformity or crepitus. Decreased range of motion. Normal pulse.        Arms:       Cervical back: Normal range of motion.      Comments: Patient is guarding all range of the right shoulder, and anterior, lateral, posterior plane   Skin:     General: Skin is warm and dry.      Capillary Refill: Capillary refill takes less than 2 seconds.   Neurological:      General: No focal deficit present.      Mental Status: He is alert and oriented to person, place, and time.   Psychiatric:         Mood and Affect: Mood is anxious. Affect is tearful.         Procedures           ED Course  ED Course as of 01/21/25 2123 Tue Jan 21, 2025 2059 Initial evaluation in the ED pit area, patient ambulate without difficulty, but guarding his right upper extremity, accompanied by his wife. [JH]   2109 Plain film imaging the right shoulder independent interpreted by myself without evidence of bony  abnormality, AC joint separation or fracture. []   2123 I communicated with the ED charge nurse, regarding the patient who needs oral and IM meds, and they can proceed to discharge. 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  Patient with plain film imaging of the right shoulder, be offered analgesic dosing, and referral to follow-up with the orthopedic specialist.  Patient is agreeable with this plan.    Problems Addressed:  Injury of right rotator cuff, initial encounter: complicated acute illness or injury  Sprain of right shoulder, unspecified shoulder sprain type, initial encounter: complicated acute illness or injury    Amount and/or Complexity of Data Reviewed  Radiology: ordered.    Risk  Prescription drug management.        Final diagnoses:   Sprain of right shoulder, unspecified shoulder sprain type, initial encounter   Injury of right rotator cuff, initial encounter       ED Disposition  ED Disposition       ED Disposition   Discharge    Condition   Stable    Comment   --               Mauro Lake MD  [address] [facility]  [address]  [place] [address]  [phone]      As needed    Massimo Lombardi MD  [address] [place] [address] [address]  [place] [address]  [phone]    Call       Massimo Lombardi MD  [address] [place] [address]  [phone]    Call            Medication List        New Prescriptions      ketorolac 10 MG tablet  Commonly known as: TORADOL  Take  1 tablet by mouth Every 6 (Six) Hours As Needed for Moderate Pain.               Where to Get Your Medications        These medications were sent to [facility] - [place], [place] - [address] [facility] - [phone]  - [phone] [address] [address]      Phone: [phone]   ketorolac 10 MG tablet            Pelon Barbosa, APRN  01/21/25 3168

## 2025-01-23 ENCOUNTER — OFFICE VISIT (OUTPATIENT)
Dept: ORTHOPEDIC SURGERY | Facility: CLINIC | Age: 40
End: 2025-01-23
Payer: COMMERCIAL

## 2025-01-23 VITALS — DIASTOLIC BLOOD PRESSURE: 84 MMHG | SYSTOLIC BLOOD PRESSURE: 136 MMHG | BODY MASS INDEX: 26.45 KG/M2 | WEIGHT: 195 LBS
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
## 2025-01-23 DIAGNOSIS — R29.818 PSEUDOPARALYSIS: ICD-10-CM

## 2025-01-23 DIAGNOSIS — Z98.890 S/P ROTATOR CUFF REPAIR: Primary | ICD-10-CM

## 2025-01-23 DIAGNOSIS — M25.511 RIGHT SHOULDER PAIN, UNSPECIFIED CHRONICITY: ICD-10-CM

## 2025-01-23 DIAGNOSIS — M75.100 EXTERNAL ROTATION OF SHOULDER LAG SIGN: ICD-10-CM

## 2025-01-23 DIAGNOSIS — S46.011S TRAUMATIC COMPLETE TEAR OF RIGHT ROTATOR CUFF, SEQUELA: ICD-10-CM
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
## 2025-01-23 DIAGNOSIS — S46.811A FULL THICKNESS TEAR OF RIGHT SUBSCAPULARIS TENDON, INITIAL ENCOUNTER: ICD-10-CM

## 2025-01-23 RX ORDER — DIAZEPAM 5 MG/1
5 TABLET ORAL 2 TIMES DAILY PRN
Qty: 2 TABLET | Refills: 0 | Status: SHIPPED | OUTPATIENT
Start: 2025-01-23

## 2025-01-23 NOTE — PROGRESS NOTES
[facility] [place] Orthopaedic Surgery Office Follow Up - Massimo Lombardi MD  [facility]    Office Follow Up Visit       Patient Name: Francis Holland    Chief Complaint:   Chief Complaint   Patient presents with    Follow-up     14 month follow-up: Right shoulder pain due to fall (DOI: 1/21/25); S/P rotator cuff repair, right (3/27/23)       Referring Physician: No ref. provider found    History of Present Illness:   It has been 14  month(s) since Francis Holland's last visit. Francis Holland returns to clinic today for F/U: follow-up of rightBody Part: shoulderReason: pain. This is a new issue stemming from a fall 2 day(s) ago. Francis Holland rates HIS/HER: hispain at 6/10 on the pain scale. Previous/current treatments: bracing, NSAIDS, and oral steroids. Current symptoms:Symptoms: pain. The pain is worse with walking, standing, sitting, climbing stairs, sleeping, working, leisure, lying on affected side, rising from seated position, and any movement of the joint; medication improves the pain. Overall, he/she: heis doing worse. I have reviewed the patient's history of present illness as noted/entered above.    I have reviewed the patient's past medical history, surgical history, social history, family history, medications, and allergies as noted in the electronic medical record and as noted/entered.  I have reviewed the patient's review of systems as noted/enter and updated as noted in the patient's HPI.      Date of surgery 3/27/2023  Right shoulder  Preoperative Diagnosis:  1.     Rotator cuff tear, chronic subscapularis tear- treated with arthroscopic rotator cuff repair - CPT 49629  2.     Biceps tendinopathy, SLAP tearing, synovitis, medial subluxation- treated with arthroscopic biceps tenodesis - CPT 36422  3.     Shoulder impingement syndrome - treated with arthroscopic subacromial decompression with  acromioplasty - CPT 57071     Postoperative Diagnosis:  1.     SAME as preoperative diagnoses     Procedure:  1.     Arthroscopic rotator cuff repair (2 anchor subscapularis tear) - CPT 10640  2.     Arthroscopic biceps tenodesis (8-8)- CPT 74179  3.     Arthroscopic subacromial decompression with acromioplasty - CPT 97172      Right shoulder  Patient initially had an acute injury with rotator cuff tear and avulsion  Most recent injury was in November.  Dragging a deer had pain, pop, essentially pseudoparalysis of the shoulder with significant weakness of the subscapularis since that time.     He does have a known smoking history but has cut back from a pack a day to a half a pack a day.  He is on Chantix and Nicorette to try to help with smoking cessation.     I understand that some insurance companies make patients completely stop smoking in advance of surgery however this is an unfortunate case of a 37-year-old with an acute injury and rotator cuff avulsion where surgery would be recommended understanding there are risks and benefits of surgery and understanding that nonhealing of the rotator cuff and infection/wound healing or possible barriers of smoking.  I did  the patient and his supportive wife.  He is committed to cutting back.  We will plan to proceed with surgery accordingly     Known history of opioid abuse and alcohol abuse.  He and his wife note they have been sober for 10 years they sought proper care for this and have done well to overcome this difficult problem.     He is originally from [place]  She is originally from [place]; she works at [employer] primary care healthcare clinic     Self-employed  did  on the implications of this injury with regards to fine motor, recovery, rotator cuff healing, young age     Significant right shoulder debilitation     [place]     37 y.o. male  Body mass index is 25.26 kg/m².           6/29/2023:  Right  "shoulder date of surgery 3/27/2023-chronic subscapularis tear, 2 anchor repair, biceps tenodesis  Significant and chronic subscapularis rupture with his injury occurring November 2022  Overall improved/better     Motorcycle tech  Minimal discomfort  Presented pseudoparalytic and now symmetric range of motion and strength  3+ months postop  He strongly desires to return to work  Moving trees, trees landed on his house; moving trees but shoulder hasn't bothered him; lots damage; mowing, gardening  20 PT visits and still attending     Remarkable recovery to date.  He notes he has been able to use it for a lot of activities day living around the home as he said the unfortunate bad weather impacting their home.  He strongly desires to return back to work he notes he has a good relationship with his boss and he notes he will continue to be able to protect the shoulder during his recovery.  Understanding that typically 5 to 6 months for full recovery.     10/12/2023:  Right shoulder date of surgery 3/27/2023 he had a chronic subscapularis tear that occurred several months prior to that.  He had a 2 anchor repair at that time.  He was pseudoparalytic when he initially presented.  He had made a very quick recovery prior.     Last visit was 6/29/2023 recommended 2-month follow-up at that time     Excellent strength on the exam  Hoping to avoid overhead work given his shoulder injury -- desires to transition \"part-time\"/to avoid working overhead       1/23/2025:  RIGHT SHOULDER UPDATE; prior surgery 3/27/2023 nearly 2 years prior for chronic subscapularis tear at that time with pseudoparalytic presentation; 2 anchor subscapularis repair and 8-8 biceps tenodesis    He made an excellent recovery    NEW injuy/ER visit on 1/21/2025 -- slip and fall down icy stairs with significant pain and dysfunction right shoulder  ER note  and ER images reviewed  Updated images obtained    RIGHT SHOULDER  Right shoulder 2 views were reviewed " following recent trauma.  Evidence of his prior arthroscopic rotator cuff repair of the subscapularis and arthroscopic biceps tenodesis.      Subjective   Subjective      Review of Systems   Constitutional: Negative.  Negative for chills, fatigue and fever.   HENT: Negative.  Negative for congestion and dental problem.    Eyes: Negative.  Negative for blurred vision.   Respiratory: Negative.  Negative for shortness of breath.    Cardiovascular: Negative.  Negative for leg swelling.   Gastrointestinal: Negative.  Negative for abdominal pain.   Endocrine: Negative.  Negative for polyuria.   Genitourinary: Negative.  Negative for difficulty urinating.   Musculoskeletal:  Positive for arthralgias.   Skin: Negative.    Allergic/Immunologic: Negative.    Neurological: Negative.    Hematological: Negative.  Negative for adenopathy.   Psychiatric/Behavioral: Negative.  Negative for behavioral problems.         Past Medical History:   Past Medical History:   Diagnosis Date    Glaucoma     Substance abuse 2013    In recovery since .       Past Surgical History:   Past Surgical History:   Procedure Laterality Date    HERNIA REPAIR      Then had a hydra-seal repair in , [facility]    HYDROCELE EXCISION / REPAIR          SHOULDER ARTHROSCOPY Right 2023    Dr Massimo Lombardi -- Arthroscopic rotator cuff repair; arthroscopic biceps tenodesis; arthroscopic subacromical decompression with acromioplasty       Family History:   Family History   Problem Relation Age of Onset    Drug abuse Mother     Hyperlipidemia Mother     Mental illness Mother     Stroke Mother 55    Alcohol abuse Father     Drug abuse Father     Drug abuse Sister     Drug abuse Sister     Drug abuse Sister     Drug abuse Sister     Early death Brother         Kee was my older brother,  he  when he was a few weeks old.       Social History:   Social History     Socioeconomic History    Marital status:    Tobacco Use     Smoking status: Every Day     Current packs/day: 1.00     Average packs/day: 1 pack/day for 20.0 years (20.0 ttl pk-yrs)     Types: Cigarettes     Passive exposure: Current    Smokeless tobacco: Never   Vaping Use    Vaping status: Never Used   Substance and Sexual Activity    Alcohol use: Yes     Alcohol/week: 6.0 standard drinks of alcohol     Types: 6 Cans of beer per week     Comment: Occasional    Drug use: Not Currently     Types: Marijuana     Comment: In recovery since 2013    Sexual activity: Yes     Partners: Female       Medications:   Current Outpatient Medications:     acetaminophen (TYLENOL) 500 MG tablet, Take 1 tablet by mouth Every 6 (Six) Hours As Needed for Mild Pain. Otc as needed, Disp: , Rfl:     busPIRone (BUSPAR) 5 MG tablet, Take 1 tablet by mouth 3 (Three) Times a Day., Disp: 270 tablet, Rfl: 1    cetirizine (zyrTEC) 10 MG tablet, Take 1 tablet by mouth Daily., Disp: 90 tablet, Rfl: 1    diazePAM (Valium) 5 MG tablet, Take 1 tablet by mouth 2 (Two) Times a Day As Needed for Anxiety., Disp: 2 tablet, Rfl: 0    Allergies: No Known Allergies    The following portions of the patient's history were reviewed and updated as appropriate: allergies, current medications, past family history, past medical history, past social history, past surgical history and problem list.        Objective    Objective      Vital Signs:   Vitals:    01/23/25 1336   BP: 136/84   Weight: 88.5 kg (195 lb)       Ortho Exam:  RIGHT SHOULDER  Profound pseudoparalysis of the right shoulder.  Weakness with Jobes testing, positive lag signs with Jobes testing, weakness with subscapularis testing internal rotation and external rotation testing.  Minimal active range of motion due to pain and pseudoparalytic presentation 60/60/20.  Pain limited arc of motion passively as well.    Results Review:  Imaging Results (Last 24 Hours)       Procedure Component Value Units Date/Time    XR Shoulder 2+ View Right [[id number]] Resulted:  01/23/25 1357     Updated: 01/23/25 1357    Narrative:      Imaging: shoulder x-rays 2 views - AP and axillary x-ray views    Side: RIGHT SHOULDER    Indication for shoulder x-ray 2 views: shoulder pain    Comparison: prior clinic comparison views available    Findings: Right shoulder 2 views after recent fall on ice.  Evidence of   prior arthroscopic rotator cuff repair/subscapularis and evidence of   arthroscopic biceps tenodesis.  Right shoulder remains centered on   axillary imaging and no obvious loss of acromiohumeral index.  No obvious   fracture noted.    I personally reviewed the above x-rays.            XR Shoulder 2+ View Right    Result Date: 1/21/2025  Impression: No evidence of fracture Electronically Signed: Damion Ramírez  1/21/2025 9:03 PM EST  Workstation ID: [id number]        I reviewed the images above no acute bony changes noted on ER imaging        Assessment / Plan      Assessment/Plan:   Problem List Items Addressed This Visit          Musculoskeletal and Injuries    S/P rotator cuff repair - Primary    Relevant Medications    diazePAM (Valium) 5 MG tablet    Other Relevant Orders    MRI Shoulder Right Without Contrast    Traumatic complete tear of right rotator cuff    Relevant Medications    diazePAM (Valium) 5 MG tablet    Other Relevant Orders    MRI Shoulder Right Without Contrast    Full thickness tear of right subscapularis tendon    Relevant Medications    diazePAM (Valium) 5 MG tablet    Other Relevant Orders    MRI Shoulder Right Without Contrast    Fall from slipping on ice    Relevant Medications    diazePAM (Valium) 5 MG tablet    Other Relevant Orders    MRI Shoulder Right Without Contrast    Cause of injury, fall    Relevant Medications    diazePAM (Valium) 5 MG tablet    Other Relevant Orders    MRI Shoulder Right Without Contrast       Other    External rotation of shoulder lag sign    Relevant Medications    diazePAM (Valium) 5 MG tablet    Other Relevant Orders    MRI Shoulder  Right Without Contrast    Pseudoparalysis    Relevant Medications    diazePAM (Valium) 5 MG tablet    Other Relevant Orders    MRI Shoulder Right Without Contrast     Other Visit Diagnoses       Right shoulder pain, unspecified chronicity        Relevant Medications    diazePAM (Valium) 5 MG tablet    Other Relevant Orders    XR Shoulder 2+ View Right (Completed)    MRI Shoulder Right Without Contrast            Right shoulder acute rotator cuff injury with a fall.  Unfortunately it appears that it may involve the subscapularis again which was a chronic tear before.  Understand this is very difficult news.  He was doing so incredibly well and this is a very challenging fall.  Concern for avulsion of additional rotator cuff tendons.  Stat right shoulder MRI is indicated at this time in the setting of prior surgery and acute rotator cuff avulsion  MRI of the shoulder is recommended.  Indication: suspected rotator cuff tear  The MRI is critical to evaluate for rotator cuff tearing and will help with possible surgical planning.  RIGHT SHOULDER    WORK note:  Work status:   RIGHT ARM  5 lbs lifting restriction with the affected arm  No overhead lifting with the affected arm      Prior MRI he had to have done in an open setting at [facility] which is reasonable.  We did provide Valium at last time as well to help with comfort due to anxiety surrounding MRI.      Follow Up: AFTER stat right shoulder mri      Massimo Lombardi MD, FAAOS  Orthopedic Surgeon, Shoulder Surgery  [facility]  Orthopedics and Sports Medicine  [address], [address]  [place] [address]    & New Location:  [facility] Location  [address], [address]  [place] [address]    01/23/25  14:10 EST

## 2025-01-31 ENCOUNTER — OFFICE VISIT (OUTPATIENT)
Dept: ORTHOPEDIC SURGERY | Facility: CLINIC | Age: 40
End: 2025-01-31
Payer: COMMERCIAL

## 2025-01-31 VITALS
DIASTOLIC BLOOD PRESSURE: 86 MMHG | BODY MASS INDEX: 26.43 KG/M2 | HEIGHT: 72 IN | SYSTOLIC BLOOD PRESSURE: 128 MMHG | WEIGHT: 195.11 LBS
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
## 2025-01-31 DIAGNOSIS — S46.811A FULL THICKNESS TEAR OF RIGHT SUBSCAPULARIS TENDON, INITIAL ENCOUNTER: ICD-10-CM
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
## 2025-01-31 DIAGNOSIS — Z98.890 S/P ROTATOR CUFF REPAIR: Primary | ICD-10-CM
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
## 2025-02-13 ENCOUNTER — OFFICE VISIT (OUTPATIENT)
Dept: FAMILY MEDICINE CLINIC | Facility: CLINIC | Age: 40
End: 2025-02-13
Payer: COMMERCIAL

## 2025-02-13 ENCOUNTER — LAB (OUTPATIENT)
Dept: LAB | Facility: HOSPITAL | Age: 40
End: 2025-02-13
Payer: COMMERCIAL

## 2025-02-13 VITALS
HEART RATE: 79 BPM | DIASTOLIC BLOOD PRESSURE: 88 MMHG | OXYGEN SATURATION: 97 % | RESPIRATION RATE: 18 BRPM | HEIGHT: 72 IN | TEMPERATURE: 98.7 F | WEIGHT: 190.8 LBS | BODY MASS INDEX: 25.84 KG/M2 | SYSTOLIC BLOOD PRESSURE: 126 MMHG

## 2025-02-13 DIAGNOSIS — Z00.00 ENCOUNTER FOR ROUTINE ADULT HEALTH EXAMINATION WITHOUT ABNORMAL FINDINGS: ICD-10-CM

## 2025-02-13 DIAGNOSIS — F17.200 SMOKER: ICD-10-CM

## 2025-02-13 DIAGNOSIS — Z00.00 ENCOUNTER FOR ROUTINE ADULT HEALTH EXAMINATION WITHOUT ABNORMAL FINDINGS: Primary | ICD-10-CM

## 2025-02-13 DIAGNOSIS — F41.1 GAD (GENERALIZED ANXIETY DISORDER): ICD-10-CM

## 2025-02-13 DIAGNOSIS — F32.A DEPRESSION, UNSPECIFIED DEPRESSION TYPE: ICD-10-CM

## 2025-02-13 LAB
ALBUMIN SERPL-MCNC: 4.5 G/DL (ref 3.5–5.2)
ALBUMIN/GLOB SERPL: 1.5 G/DL
ALP SERPL-CCNC: 85 U/L (ref 39–117)
ALT SERPL W P-5'-P-CCNC: 37 U/L (ref 1–41)
ANION GAP SERPL CALCULATED.3IONS-SCNC: 13.1 MMOL/L (ref 5–15)
AST SERPL-CCNC: 43 U/L (ref 1–40)
BILIRUB SERPL-MCNC: <0.2 MG/DL (ref 0–1.2)
BUN SERPL-MCNC: 12 MG/DL (ref 6–20)
BUN/CREAT SERPL: 14.3 (ref 7–25)
CALCIUM SPEC-SCNC: 9.3 MG/DL (ref 8.6–10.5)
CHLORIDE SERPL-SCNC: 102 MMOL/L (ref 98–107)
CHOLEST SERPL-MCNC: 235 MG/DL (ref 0–200)
CO2 SERPL-SCNC: 21.9 MMOL/L (ref 22–29)
CREAT SERPL-MCNC: 0.84 MG/DL (ref 0.76–1.27)
DEPRECATED RDW RBC AUTO: 42.2 FL (ref 37–54)
EGFRCR SERPLBLD CKD-EPI 2021: 112.4 ML/MIN/1.73
ERYTHROCYTE [DISTWIDTH] IN BLOOD BY AUTOMATED COUNT: 12.5 % (ref 12.3–15.4)
GLOBULIN UR ELPH-MCNC: 3.1 GM/DL
GLUCOSE SERPL-MCNC: 87 MG/DL (ref 65–99)
HBA1C MFR BLD: 5.1 % (ref 4.8–5.6)
HCT VFR BLD AUTO: 42.4 % (ref 37.5–51)
HDLC SERPL-MCNC: 96 MG/DL (ref 40–60)
HGB BLD-MCNC: 14.8 G/DL (ref 13–17.7)
LDLC SERPL CALC-MCNC: 125 MG/DL (ref 0–100)
LDLC/HDLC SERPL: 1.28 {RATIO}
MCH RBC QN AUTO: 32.4 PG (ref 26.6–33)
MCHC RBC AUTO-ENTMCNC: 34.9 G/DL (ref 31.5–35.7)
MCV RBC AUTO: 92.8 FL (ref 79–97)
PLATELET # BLD AUTO: 257 10*3/MM3 (ref 140–450)
PMV BLD AUTO: 10.3 FL (ref 6–12)
POTASSIUM SERPL-SCNC: 4.5 MMOL/L (ref 3.5–5.2)
PROT SERPL-MCNC: 7.6 G/DL (ref 6–8.5)
RBC # BLD AUTO: 4.57 10*6/MM3 (ref 4.14–5.8)
SODIUM SERPL-SCNC: 137 MMOL/L (ref 136–145)
TRIGL SERPL-MCNC: 82 MG/DL (ref 0–150)
TSH SERPL DL<=0.05 MIU/L-ACNC: 1.11 UIU/ML (ref 0.27–4.2)
VLDLC SERPL-MCNC: 14 MG/DL (ref 5–40)
WBC NRBC COR # BLD AUTO: 7.06 10*3/MM3 (ref 3.4–10.8)

## 2025-02-13 PROCEDURE — 99396 PREV VISIT EST AGE 40-64: CPT | Performed by: STUDENT IN AN ORGANIZED HEALTH CARE EDUCATION/TRAINING PROGRAM

## 2025-02-13 PROCEDURE — 80061 LIPID PANEL: CPT

## 2025-02-13 PROCEDURE — 80050 GENERAL HEALTH PANEL: CPT

## 2025-02-13 PROCEDURE — 83036 HEMOGLOBIN GLYCOSYLATED A1C: CPT

## 2025-02-13 RX ORDER — VENLAFAXINE HYDROCHLORIDE 75 MG/1
75 CAPSULE, EXTENDED RELEASE ORAL DAILY
Qty: 30 CAPSULE | Refills: 2 | Status: SHIPPED | OUTPATIENT
Start: 2025-02-13

## 2025-02-13 RX ORDER — IBUPROFEN 800 MG/1
800 TABLET, FILM COATED ORAL EVERY 6 HOURS PRN
COMMUNITY

## 2025-02-13 RX ORDER — VENLAFAXINE HYDROCHLORIDE 37.5 MG/1
37.5 CAPSULE, EXTENDED RELEASE ORAL DAILY
Qty: 14 CAPSULE | Refills: 0 | Status: SHIPPED | OUTPATIENT
Start: 2025-02-13

## 2025-02-13 NOTE — PROGRESS NOTES
Male Physical Note      Patient Name: Francis Holland  : 1985   MRN: [id number]     Subjective    Subjective     Chief Complaint:    Chief Complaint   Patient presents with    Annual Exam       History of Present Illness: Francis Holland is a 39 y.o. male who is here today for their annual health maintenance and physical.      Past Medical History, Social History, Family History and Care Team were all reviewed with patient and updated as appropriate.     Medications:     Current Outpatient Medications:     ibuprofen (ADVIL,MOTRIN) 800 MG tablet, Take 1 tablet by mouth Every 6 (Six) Hours As Needed for Mild Pain., Disp: , Rfl:     acetaminophen (TYLENOL) 500 MG tablet, Take 1 tablet by mouth Every 6 (Six) Hours As Needed for Mild Pain. Otc as needed (Patient not taking: Reported on 2025), Disp: , Rfl:     busPIRone (BUSPAR) 5 MG tablet, Take 1 tablet by mouth 3 (Three) Times a Day. (Patient not taking: Reported on 2025), Disp: 270 tablet, Rfl: 1    cetirizine (zyrTEC) 10 MG tablet, Take 1 tablet by mouth Daily. (Patient not taking: Reported on 2025), Disp: 90 tablet, Rfl: 1    diazePAM (Valium) 5 MG tablet, Take 1 tablet by mouth 2 (Two) Times a Day As Needed for Anxiety. (Patient not taking: Reported on 2025), Disp: 2 tablet, Rfl: 0    venlafaxine XR (Effexor XR) 75 MG 24 hr capsule, Take 1 capsule by mouth Daily., Disp: 30 capsule, Rfl: 2    venlafaxine XR (EFFEXOR-XR) 37.5 MG 24 hr capsule, Take 1 capsule by mouth Daily., Disp: 14 capsule, Rfl: 0    Allergies:   No Known Allergies    Immunizations:  Td/Tdap(Booster Q 10 yrs):  utd  Flu (Yearly):  utd    Diet/Physical activity: discussed    PHQ-2 Depression Screening  Little interest or pleasure in doing things? Almost all   Feeling down, depressed, or hopeless? Almost all   PHQ-2 Total Score 6   PHQ-9 Depression Screening  Little interest or pleasure in doing things? Almost all   Feeling down, depressed, or hopeless? Almost all  "  PHQ-2 Total Score 6   Trouble falling or staying asleep, or sleeping too much? Several days (Too much sometimes)   Feeling tired or having little energy? Not at all   Poor appetite or overeating? Not at all   Feeling bad about yourself - or that you are a failure or have let yourself or your family down? Not at all   Trouble concentrating on things, such as reading the newspaper or watching television? Almost all   Moving or speaking so slowly that other people could have noticed? Or the opposite - being so fidgety or restless that you have been moving around a lot more than usual? Almost all (fidgety)   Thoughts that you would be better off dead, or of hurting yourself in some way? Not at all (not recently)   PHQ-9 Total Score 13   If you checked off any problems, how difficult have these problems made it for you to do your work, take care of things at home, or get along with other people? Somewhat difficult         Objective   Objective     Physical Exam:  Vital Signs:   Vitals:    02/13/25 0814   BP: 126/88   BP Location: Left arm   Patient Position: Sitting   Cuff Size: Adult   Pulse: 79   Resp: 18   Temp: 98.7 °F (37.1 °C)   TempSrc: Infrared   SpO2: 97%   Weight: 86.5 kg (190 lb 12.8 oz)   Height: 182.9 cm (72.01\")     Body mass index is 25.87 kg/m².     Physical Exam  Constitutional:       General: He is not in acute distress.     Appearance: He is not ill-appearing.   Cardiovascular:      Rate and Rhythm: Normal rate and regular rhythm.   Pulmonary:      Effort: Pulmonary effort is normal.      Breath sounds: Normal breath sounds.   Neurological:      Mental Status: He is alert.   Psychiatric:         Thought Content: Thought content normal.         Procedures    Assessment / Plan      Assessment/Plan:   Diagnoses and all orders for this visit:    1. Encounter for routine adult health examination without abnormal findings (Primary)  -     CBC (No Diff); Future  -     Lipid Panel; Future  -     Hemoglobin " A1c; Future  -     TSH Rfx On Abnormal To Free T4; Future  -     Comprehensive Metabolic Panel; Future    2. JUNO (generalized anxiety disorder)  Assessment & Plan:  Worsening depression on Lexapro previously  BuSpar did not help and he stopped this  Discussed lifestyle treatments of anxiety declines therapy  Start venlafaxine discussed side effects and risk of this medicine call if side effects occur    Orders:  -     venlafaxine XR (EFFEXOR-XR) 37.5 MG 24 hr capsule; Take 1 capsule by mouth Daily.  Dispense: 14 capsule; Refill: 0  -     venlafaxine XR (Effexor XR) 75 MG 24 hr capsule; Take 1 capsule by mouth Daily.  Dispense: 30 capsule; Refill: 2    3. Depression, unspecified depression type  -     venlafaxine XR (EFFEXOR-XR) 37.5 MG 24 hr capsule; Take 1 capsule by mouth Daily.  Dispense: 14 capsule; Refill: 0  -     venlafaxine XR (Effexor XR) 75 MG 24 hr capsule; Take 1 capsule by mouth Daily.  Dispense: 30 capsule; Refill: 2    4. Smoker  Assessment & Plan:  Recommend cessation              Follow Up:   Return in about 7 weeks (around 4/3/2025) for Follow-up mood .    Healthcare Maintenance:   Health maintenance counseling included discussion of healthy diet and physical activity, Dental hygiene, and vaccinations.  Francis Holland voices understanding and acceptance of this advice and will call back with any further questions or concerns. AVS with preventive healthcare tips printed for patient.     Mauro Lake MD  Family Medicine - [facility] [place]

## 2025-02-13 NOTE — ASSESSMENT & PLAN NOTE
Worsening depression on Lexapro previously  BuSpar did not help and he stopped this  Discussed lifestyle treatments of anxiety declines therapy  Start venlafaxine discussed side effects and risk of this medicine call if side effects occur